# Patient Record
Sex: FEMALE | Race: WHITE | Employment: FULL TIME | URBAN - METROPOLITAN AREA
[De-identification: names, ages, dates, MRNs, and addresses within clinical notes are randomized per-mention and may not be internally consistent; named-entity substitution may affect disease eponyms.]

---

## 2017-09-02 ENCOUNTER — HOSPITAL ENCOUNTER (EMERGENCY)
Facility: CLINIC | Age: 22
Discharge: HOME | End: 2017-09-02
Attending: EMERGENCY MEDICINE | Admitting: EMERGENCY MEDICINE
Payer: MEDICAID

## 2017-09-02 VITALS
RESPIRATION RATE: 18 BRPM | DIASTOLIC BLOOD PRESSURE: 74 MMHG | OXYGEN SATURATION: 100 % | WEIGHT: 117 LBS | SYSTOLIC BLOOD PRESSURE: 127 MMHG | TEMPERATURE: 97.8 F | BODY MASS INDEX: 20.08 KG/M2 | HEART RATE: 72 BPM

## 2017-09-02 DIAGNOSIS — S09.90XA CLOSED HEAD INJURY, INITIAL ENCOUNTER: ICD-10-CM

## 2017-09-02 DIAGNOSIS — S01.01XA LACERATION OF SCALP, INITIAL ENCOUNTER: ICD-10-CM

## 2017-09-02 PROCEDURE — 12001 RPR S/N/AX/GEN/TRNK 2.5CM/<: CPT

## 2017-09-02 PROCEDURE — 99283 EMERGENCY DEPT VISIT LOW MDM: CPT

## 2017-09-02 RX ORDER — BUPIVACAINE HYDROCHLORIDE AND EPINEPHRINE 5; 5 MG/ML; UG/ML
INJECTION, SOLUTION PERINEURAL
Status: DISCONTINUED
Start: 2017-09-02 | End: 2017-09-02 | Stop reason: HOSPADM

## 2017-09-02 NOTE — ED NOTES
21-year-old female presents to the ER with complaints of a large wooden decorative plaque fall out of the closet and hit her on the back of her head. Pt states she has a laceration to the back of her head, bleeding controlled at this time.

## 2017-09-02 NOTE — ED AVS SNAPSHOT
[facility] Emergency Department    [address]    [facility] [address]    Phone:  [phone]    Fax:  [phone]                                       Desirae Kevin   MRN: [id number]    Department:  [facility] Emergency Department   Date of Visit:  9/2/2017           After Visit Summary Signature Page     I have received my discharge instructions, and my questions have been answered. I have discussed any challenges I see with this plan with the nurse or doctor.    ..........................................................................................................................................  Patient/Patient Representative Signature      ..........................................................................................................................................  Patient Representative Print Name and Relationship to Patient    ..................................................               ................................................  Date                                            Time    ..........................................................................................................................................  Reviewed by Signature/Title    ...................................................              ..............................................  Date                                                            Time

## 2017-09-02 NOTE — ED AVS SNAPSHOT
[facility] Emergency Department    [address]    [facility] [address]    Phone:  [phone]    Fax:  [phone]                                       Desirae Kevin   MRN: [id number]    Department:  [facility] Emergency Department   Date of Visit:  9/2/2017           Patient Information     Date Of Birth          1995        Your diagnoses for this visit were:     Closed head injury, initial encounter     Laceration of scalp, initial encounter        You were seen by Becky Baxter MD.      Follow-up Information     Follow up with Kim Hall MD In 1 week.    Specialty:  Pediatrics    Contact information:    [facility]  [address] [place] [address] [phone]          Follow up with [facility] Emergency Department.    Specialty:  EMERGENCY MEDICINE    Why:  As needed    Contact information:    [address] [facility] [address] [phone]        Discharge Instructions       Discharge Instructions  Head Injury    You have been seen today for a head injury. Your evaluation included a history and physical examination. You may have had a CT (CAT) scan performed, though most head injuries do not require a scan. Based on this evaluation, your provider today does not feel that your head injury is serious.    Generally, every Emergency Department visit should have a follow-up clinic visit with either a primary or a specialty clinic/provider. Please follow-up as instructed by your emergency provider today.  Return to the Emergency Department if:    You are confused or you are not acting right.    Your headache gets worse or you start to have a really bad headache even with your recommended treatment plan.    You vomit (throw up) more than once.    You have a seizure.    You have trouble walking.    You have weakness or paralysis (cannot move) in an arm or a leg.    You have blood or fluid  coming from your ears or nose.    You have new symptoms or anything that worries you.    Sleeping:  It is okay for you to sleep, but someone should wake you up if instructed by your provider, and someone should check on you at your usual time to wake up.     Activity:    Do not drive for at least 24 hours.    Do not drive if you have dizzy spells or trouble concentrating, or remembering things.    Do not return to any contact sports until cleared by your regular provider.     MORE INFORMATION:    Concussion:  A concussion is a minor head injury that may cause temporary problems with the way the brain works. Although concussions are important, they are generally not an emergency or a reason that a person needs to be hospitalized. Some concussion symptoms include confusion, amnesia (forgetful), nausea (sick to your stomach) and vomiting (throwing up), dizziness, fatigue, memory or concentration problems, irritability and sleep problems. For most people, concussions are mild and temporary but some will have more severe and persistent symptoms that require on-going care and treatment.  CT Scans: Your evaluation today may have included a CT scan (CAT scan) to look for things like bleeding or a skull fracture (broken bone).  CT scans involve radiation and too many CT scans can cause serious health problems like cancer, especially in children.  Because of this, your provider may not have ordered a CT scan today if they think you are at low risk for a serious or life threatening problem.    If you were given a prescription for medicine here today, be sure to read all of the information (including the package insert) that comes with your prescription.  This will include important information about the medicine, its side effects, and any warnings that you need to know about.  The pharmacist who fills the prescription can provide more information and answer questions you may have about the medicine.  If you have questions or  concerns that the pharmacist cannot address, please call or return to the Emergency Department.     Remember that you can always come back to the Emergency Department if you are not able to see your regular provider in the amount of time listed above, if you get any new symptoms, or if there is anything that worries you.Discharge Instructions  Laceration (Cut)    You were seen today for a laceration (cut).  Your provider examined your laceration for any problems such a buried foreign body (like glass, a splinter, or gravel), or injury to blood vessels, tendons, and nerves.  Your provider may have also rinsed and/or scrubbed your laceration to help prevent an infection. It may not be possible to find all problems with your laceration on the first visit; occasionally foreign bodies or a tendon injury can go undetected.    Your laceration may have been closed in one of several ways:    No closure: many wounds will heal just fine without closure.    Stitches: regular stitches that require removal.    Staples: skin staples are often used in the scalp/head.    Wound adhesive (glue): skin glue can be used for certain lacerations and doesn t require removal.    Wound strips (aka Butterfly bandages or steri-strips): these are bandages that help to close a wound.    Absorbable stitches:  dissolving  stitches that go away on their own and usually don t require removal.    A small percentage of wounds will develop an infection regardless of how well the wound is cared for. Antibiotics are generally not indicated to prevent an infection so are only given for a small number of high-risk wounds. Some lacerations are too high risk to close, and are left open to heal because closure can increase the likelihood that an infection will develop.    Remember that all lacerations, no matter how expertly repaired, will cause scarring. We consider many factors, techniques, and materials, in our efforts to provide the best possible cosmetic  outcome.    Generally, every Emergency Department visit should have a follow-up clinic visit with either a primary or a specialty clinic/provider. Please follow-up as instructed by your emergency provider today.     Return to the Emergency Department right away if:    You have more redness, swelling, pain, drainage (pus), a bad smell, or red streaking from your laceration as these symptoms could indicate an infection.    You have a fever of 100.4 F or more.    You have bleeding that you cannot stop at home. If your cut starts to bleed, hold pressure on the bleeding area with a clean cloth or put pressure over the bandage.  If the bleeding does not stop after using constant pressure for 30 minutes, you should return to the Emergency Department for further treatment.    An area past the laceration is cool, pale, or blue compared with the other side, or has a slower return of color when squeezed.    Your dressing seems too tight or starts to get uncomfortable or painful. For children, signs of a problem might be irritability or restlessness.    You have loss of normal function or use of an area, such as being unable to straighten or bend a finger normally.    You have a numb area past the laceration.    Return to the Emergency Department or see your regular provider if:    The laceration starts to come open.     You have something coming out of the cut or a feeling that there is something in the laceration.    Your wound will not heal, or keeps breaking open. There can always be glass, wood, dirt or other things in any wound.  They will not always show up, even on x-rays.  If a wound does not heal, this may be why, and it is important to follow-up with your regular provider.    Home Care:    Take your dressing off in 12-24 hours, or as instructed by your provider, to check your laceration. Remove the dressing sooner if it seems too tight or painful, or if it is getting numb, tingly, or pale past the dressing.    Gently  wash your laceration 1-2 times daily with clean water and mild soap. It is okay to shower or run clean water over the laceration, but do not let the laceration soak in water (no swimming).    If your laceration was closed with wound adhesive or strips: pat it dry and leave it open to the air. For all other repairs: after you wash your laceration, or at least 2 times a day, apply antibiotic ointment (such as Neosporin  or Bacitracin ) to the laceration, then cover it with a Band-Aid  or gauze.    Keep the laceration clean. Wear gloves or other protective clothing if you are around dirt.    Follow-up for removal:    If your wound was closed with staples or regular stitches, they need to be removed according to the instructions and timeline specified by your provider today.    If your wound was closed with absorbable ( dissolving ) sutures, they should fall out, dissolve, or not be visible in about one week. If they are still visible, then they should be removed according to the instructions and timeline specified by your provider today.    Scars:  To help minimize scarring:    Wear sunscreen over the healed laceration when out in the sun.    Massage the area regularly once healed.    You may apply Vitamin E to the healed wound.    Wait. Scars improve in appearance over months and years.    If you were given a prescription for medicine here today, be sure to read all of the information (including the package insert) that comes with your prescription.  This will include important information about the medicine, its side effects, and any warnings that you need to know about.  The pharmacist who fills the prescription can provide more information and answer questions you may have about the medicine.  If you have questions or concerns that the pharmacist cannot address, please call or return to the Emergency Department.       Remember that you can always come back to the Emergency Department if you are not able to see your  regular provider in the amount of time listed above, if you get any new symptoms, or if there is anything that worries you.      24 Hour Appointment Hotline       To make an appointment at any [facility], call [phone] ([phone]). If you don't have a family doctor or clinic, we will help you find one. [facility] are conveniently located to serve the needs of you and your family.             Review of your medicines      Our records show that you are taking the medicines listed below. If these are incorrect, please call your family doctor or clinic.        Dose / Directions Last dose taken    prenatal multivitamin plus iron 27-0.8 MG Tabs per tablet   Dose:  1 tablet        Take 1 tablet by mouth daily   Refills:  0                Orders Needing Specimen Collection     None      Pending Results     No orders found from 8/31/2017 to 9/3/2017.            Pending Culture Results     No orders found from 8/31/2017 to 9/3/2017.            Pending Results Instructions     If you had any lab results that were not finalized at the time of your Discharge, you can call the ED Lab Result RN at [phone]. You will be contacted by this team for any positive Lab results or changes in treatment. The nurses are available 7 days a week from 10A to 6:30P.  You can leave a message 24 hours per day and they will return your call.        Test Results From Your Hospital Stay               Clinical Quality Measure: Blood Pressure Screening     Your blood pressure was checked while you were in the emergency department today. The last reading we obtained was  BP: 127/74 . Please read the guidelines below about what these numbers mean and what you should do about them.  If your systolic blood pressure (the top number) is less than 120 and your diastolic blood pressure (the bottom number) is less than 80, then your blood pressure is normal. There is nothing more that you need to do about it.  If your systolic blood  "pressure (the top number) is 120-139 or your diastolic blood pressure (the bottom number) is 80-89, your blood pressure may be higher than it should be. You should have your blood pressure rechecked within a year by a primary care provider.  If your systolic blood pressure (the top number) is 140 or greater or your diastolic blood pressure (the bottom number) is 90 or greater, you may have high blood pressure. High blood pressure is treatable, but if left untreated over time it can put you at risk for heart attack, stroke, or kidney failure. You should have your blood pressure rechecked by a primary care provider within the next 4 weeks.  If your provider in the emergency department today gave you specific instructions to follow-up with your doctor or provider even sooner than that, you should follow that instruction and not wait for up to 4 weeks for your follow-up visit.        Thank you for choosing [facility]       Thank you for choosing [facility] for your care. Our goal is always to provide you with excellent care. Hearing back from our patients is one way we can continue to improve our services. Please take a few minutes to complete the written survey that you may receive in the mail after you visit with us. Thank you!        [facility] Information     [employer] lets you send messages to your doctor, view your test results, renew your prescriptions, schedule appointments and more. To sign up, go to [email or web address] . Click on \"Log in\" on the left side of the screen, which will take you to the Welcome page. Then click on \"Sign up Now\" on the right side of the page.     You will be asked to enter the access code listed below, as well as some personal information. Please follow the directions to create your username and password.     Your access code is: [email or web address]  Expires: 2017  2:03 AM     Your access code will  in 90 days. If you need help or a new code, please call your [facility] clinic or " [phone].        Care EveryWhere ID     This is your Care EveryWhere ID. This could be used by other organizations to access your [facility] medical records  [id number]        Equal Access to Services     KATHERINE KRAUS : Eduar Tovar, warishi khan, qaybta kaalmada barbara, zenon xiao. So [facility] [phone].    ATENCIÓN: Si habla español, tiene a justice disposición servicios gratuitos de asistencia lingüística. Llame al [phone].    We comply with applicable federal civil rights laws and [place] laws. We do not discriminate on the basis of race, color, national origin, age, disability sex, sexual orientation or gender identity.            After Visit Summary       This is your record. Keep this with you and show to your community pharmacist(s) and doctor(s) at your next visit.

## 2017-09-02 NOTE — ED PROVIDER NOTES
[facility] Emergency Medicine Note:    History     Chief Complaint:  Laceration and Head Injury      HPI: Desirae Kevin is a 21 year old female who presents for evaluation of  a head injury.  A key rack fell off the wall hitting her in the back of her head approximately 1 hour prior to arrival.  She had no loss of consciousness no nausea or vomiting and has had no neurologic symptoms.  She has pain at the site of the laceration at the apex of her scalp.  Her tetanus was updated in 2015.      Allergies:  No Known Allergies    Medications:      Prenatal Vit-Fe Fumarate-FA (PRENATAL MULTIVITAMIN  PLUS IRON) 27-0.8 MG TABS       Problem List:    Patient Active Problem List    Diagnosis Date Noted     Labor and delivery, indication for care 11/06/2015     Priority: Medium     Vaginal delivery 03/28/2014     Priority: Medium       Past Medical History:    Past Medical History:   Diagnosis Date     Anemia 2015     Migraine        Past Surgical History:    History reviewed. No pertinent surgical history.    Family History:    No family history on file.    Social History:  Social History   Substance Use Topics     Smoking status: Never Smoker     Smokeless tobacco: Never Used     Alcohol use No       Review of Systems  See HPI, a 10 point review of systems was performed and is otherwise negative except as noted in HPI.     Physical Exam   Vital signs:  Patient Vitals for the past 24 hrs:   BP Temp Temp src Pulse Resp SpO2 Weight   09/02/17 0102 127/74 97.8  F (36.6  C) Temporal 72 18 100 % 53.1 kg (117 lb)       Physical Exam    Nursing note and vitals reviewed.    Constitutional: Pleasant and well groomed.          HENT: There is a 2 cm laceration at the apex of her scalp.  There is no significant scalp hematoma.   Mouth/Throat: Oropharynx is without swelling or erythema. Oral mucosa moist.    Eyes: Conjunctivae normal are normal. No scleral icterus.    Neck: Neck supple. No midline cervical tenderness. Full  range of motion.   Cardiovascular: Normal rate, regular rhythm and intact distal pulses.    Pulmonary/Chest: Effort normal and breath sounds normal.   Abdominal: Soft.  No distension. There is no tenderness.   Musculoskeletal:  No edema, No calf tenderness  Neurological:Alert and oriented.  Cranial nerves II through XII intact Coordination normal.  upper and lower extremity strength intact.  She has a normal steady gait  Skin: Skin is warm and dry.   Psychiatric: Normal mood and affect.         Emergency Department Course       Procedures:     Laceration Repair      LACERATION:  A simple clean 2 cm laceration.    LOCATION:  [place] of scalp.    FUNCTION:  No associated bone abnormality on exploration. Neuro intact.     ANESTHESIA:  Local using bupivicaine with epinephrine  total of 5 mLs.    PREPARATION:  Irrigation with Normal Saline.    DEBRIDEMENT:  no debridement.    CLOSURE:  Wound was closed with 5 Staples.      Interventions:  Medications   bupivacaine 0.5 % -  EPINEPHrine 1:200,000 0.5% -[id number] injection (not administered)       Emergency Department Course:  I performed the above history and physical examination.   See above for procedure note.   I explained the plan for treatment, follow up and indications for return to the ED.       Impression & Plan    Medical Decision Making:  This patient presents with a history of a mild closed head injury resulting in a scalp laceration..  The differential diagnosis includes skull fracture, epidural hematoma, subdural hematoma, intracerebral hemorrhage, and traumatic subarachnoid hemorrhage; all of these are highly unlikely in this clinical setting.  This patient denies severe headache, seizure, and has no focal neurological findings.  The patient did not have prolonged LOC, sleepiness, repeated emesis, poor orientation, or significant irritability.  I have discussed the risk/benefit analysis with the patient and family regarding CT imaging.  We are in agreement that a  CT scan will not be obtained at this time. This decision making is in agreement with Brain CT Guidelines.     The laceration was explored and there was no bony abnormality.  The laceration was repaired per the procedure note above.  The patient received standard laceration discharge instructions.     The patient/family understand that they must return to the ED with the development of worrisome signs or symptoms including but not limited to; worsening headache , increased drowsiness, strange behavior, repetitive speech, seizures, repeated vomiting, growing confusion, increased irritability, slurred speech, weakness or numbness, and loss of responsiveness.  We have discussed post concussive syndrome and they were provided with the [facility] Concussion Discharge Instructions. I have recommended follow up with PMD in 3  days for ongoing evaluation and management if you have any concerns about ongoing headache.       Diagnosis:     ICD-10-CM    1. Closed head injury, initial encounter S09.90XA    2. Laceration of scalp, initial encounter S01.01XA        Plan:   1. Return to the ED with new or worse symptoms  2. Follow up with your PMDfor ongoing evaluation and management  3. Provided with standard [facility] Discharge instructions for Head Injury and Concussion and laceration repair.       Diagnosis:    ICD-10-CM    1. Closed head injury, initial encounter S09.90XA    2. Laceration of scalp, initial encounter S01.01XA        Disposition:  discharged to home with responsible adult.     Discharge Medications:  New Prescriptions    No medications on file          Becky Baxter MD  09/02/17 0212

## 2017-09-02 NOTE — DISCHARGE INSTRUCTIONS
Discharge Instructions  Head Injury    You have been seen today for a head injury. Your evaluation included a history and physical examination. You may have had a CT (CAT) scan performed, though most head injuries do not require a scan. Based on this evaluation, your provider today does not feel that your head injury is serious.    Generally, every Emergency Department visit should have a follow-up clinic visit with either a primary or a specialty clinic/provider. Please follow-up as instructed by your emergency provider today.  Return to the Emergency Department if:    You are confused or you are not acting right.    Your headache gets worse or you start to have a really bad headache even with your recommended treatment plan.    You vomit (throw up) more than once.    You have a seizure.    You have trouble walking.    You have weakness or paralysis (cannot move) in an arm or a leg.    You have blood or fluid coming from your ears or nose.    You have new symptoms or anything that worries you.    Sleeping:  It is okay for you to sleep, but someone should wake you up if instructed by your provider, and someone should check on you at your usual time to wake up.     Activity:    Do not drive for at least 24 hours.    Do not drive if you have dizzy spells or trouble concentrating, or remembering things.    Do not return to any contact sports until cleared by your regular provider.     MORE INFORMATION:    Concussion:  A concussion is a minor head injury that may cause temporary problems with the way the brain works. Although concussions are important, they are generally not an emergency or a reason that a person needs to be hospitalized. Some concussion symptoms include confusion, amnesia (forgetful), nausea (sick to your stomach) and vomiting (throwing up), dizziness, fatigue, memory or concentration problems, irritability and sleep problems. For most people, concussions are mild and temporary but some will have more  severe and persistent symptoms that require on-going care and treatment.  CT Scans: Your evaluation today may have included a CT scan (CAT scan) to look for things like bleeding or a skull fracture (broken bone).  CT scans involve radiation and too many CT scans can cause serious health problems like cancer, especially in children.  Because of this, your provider may not have ordered a CT scan today if they think you are at low risk for a serious or life threatening problem.    If you were given a prescription for medicine here today, be sure to read all of the information (including the package insert) that comes with your prescription.  This will include important information about the medicine, its side effects, and any warnings that you need to know about.  The pharmacist who fills the prescription can provide more information and answer questions you may have about the medicine.  If you have questions or concerns that the pharmacist cannot address, please call or return to the Emergency Department.     Remember that you can always come back to the Emergency Department if you are not able to see your regular provider in the amount of time listed above, if you get any new symptoms, or if there is anything that worries you.Discharge Instructions  Laceration (Cut)    You were seen today for a laceration (cut).  Your provider examined your laceration for any problems such a buried foreign body (like glass, a splinter, or gravel), or injury to blood vessels, tendons, and nerves.  Your provider may have also rinsed and/or scrubbed your laceration to help prevent an infection. It may not be possible to find all problems with your laceration on the first visit; occasionally foreign bodies or a tendon injury can go undetected.    Your laceration may have been closed in one of several ways:    No closure: many wounds will heal just fine without closure.    Stitches: regular stitches that require removal.    Staples: skin  staples are often used in the scalp/head.    Wound adhesive (glue): skin glue can be used for certain lacerations and doesn t require removal.    Wound strips (aka Butterfly bandages or steri-strips): these are bandages that help to close a wound.    Absorbable stitches:  dissolving  stitches that go away on their own and usually don t require removal.    A small percentage of wounds will develop an infection regardless of how well the wound is cared for. Antibiotics are generally not indicated to prevent an infection so are only given for a small number of high-risk wounds. Some lacerations are too high risk to close, and are left open to heal because closure can increase the likelihood that an infection will develop.    Remember that all lacerations, no matter how expertly repaired, will cause scarring. We consider many factors, techniques, and materials, in our efforts to provide the best possible cosmetic outcome.    Generally, every Emergency Department visit should have a follow-up clinic visit with either a primary or a specialty clinic/provider. Please follow-up as instructed by your emergency provider today.     Return to the Emergency Department right away if:    You have more redness, swelling, pain, drainage (pus), a bad smell, or red streaking from your laceration as these symptoms could indicate an infection.    You have a fever of 100.4 F or more.    You have bleeding that you cannot stop at home. If your cut starts to bleed, hold pressure on the bleeding area with a clean cloth or put pressure over the bandage.  If the bleeding does not stop after using constant pressure for 30 minutes, you should return to the Emergency Department for further treatment.    An area past the laceration is cool, pale, or blue compared with the other side, or has a slower return of color when squeezed.    Your dressing seems too tight or starts to get uncomfortable or painful. For children, signs of a problem might be  irritability or restlessness.    You have loss of normal function or use of an area, such as being unable to straighten or bend a finger normally.    You have a numb area past the laceration.    Return to the Emergency Department or see your regular provider if:    The laceration starts to come open.     You have something coming out of the cut or a feeling that there is something in the laceration.    Your wound will not heal, or keeps breaking open. There can always be glass, wood, dirt or other things in any wound.  They will not always show up, even on x-rays.  If a wound does not heal, this may be why, and it is important to follow-up with your regular provider.    Home Care:    Take your dressing off in 12-24 hours, or as instructed by your provider, to check your laceration. Remove the dressing sooner if it seems too tight or painful, or if it is getting numb, tingly, or pale past the dressing.    Gently wash your laceration 1-2 times daily with clean water and mild soap. It is okay to shower or run clean water over the laceration, but do not let the laceration soak in water (no swimming).    If your laceration was closed with wound adhesive or strips: pat it dry and leave it open to the air. For all other repairs: after you wash your laceration, or at least 2 times a day, apply antibiotic ointment (such as Neosporin  or Bacitracin ) to the laceration, then cover it with a Band-Aid  or gauze.    Keep the laceration clean. Wear gloves or other protective clothing if you are around dirt.    Follow-up for removal:    If your wound was closed with staples or regular stitches, they need to be removed according to the instructions and timeline specified by your provider today.    If your wound was closed with absorbable ( dissolving ) sutures, they should fall out, dissolve, or not be visible in about one week. If they are still visible, then they should be removed according to the instructions and timeline  specified by your provider today.    Scars:  To help minimize scarring:    Wear sunscreen over the healed laceration when out in the sun.    Massage the area regularly once healed.    You may apply Vitamin E to the healed wound.    Wait. Scars improve in appearance over months and years.    If you were given a prescription for medicine here today, be sure to read all of the information (including the package insert) that comes with your prescription.  This will include important information about the medicine, its side effects, and any warnings that you need to know about.  The pharmacist who fills the prescription can provide more information and answer questions you may have about the medicine.  If you have questions or concerns that the pharmacist cannot address, please call or return to the Emergency Department.       Remember that you can always come back to the Emergency Department if you are not able to see your regular provider in the amount of time listed above, if you get any new symptoms, or if there is anything that worries you.

## 2018-03-16 LAB
ABO + RH BLD: NORMAL
ABO + RH BLD: NORMAL
BLD GP AB SCN SERPL QL: NORMAL
C TRACH DNA SPEC QL PROBE+SIG AMP: NEGATIVE
CULTURE MICRO: NO GROWTH
HBV SURFACE AG SERPL QL IA: NONREACTIVE
HEMOGLOBIN: 13.7 G/DL (ref 11.7–15.7)
HIV 1+2 AB+HIV1 P24 AG SERPL QL IA: NONREACTIVE
N GONORRHOEA DNA SPEC QL PROBE+SIG AMP: NEGATIVE
PAP: NORMAL
RUBELLA ANTIBODY IGG QUANTITATIVE: NORMAL IU/ML
TSH SERPL-ACNC: 2.14 MCU/ML

## 2018-06-27 ENCOUNTER — HOSPITAL ENCOUNTER (EMERGENCY)
Facility: CLINIC | Age: 23
Discharge: HOME | End: 2018-06-27
Attending: EMERGENCY MEDICINE | Admitting: EMERGENCY MEDICINE

## 2018-06-27 VITALS
WEIGHT: 120 LBS | OXYGEN SATURATION: 100 % | HEART RATE: 86 BPM | DIASTOLIC BLOOD PRESSURE: 77 MMHG | RESPIRATION RATE: 18 BRPM | TEMPERATURE: 97.2 F | BODY MASS INDEX: 20.6 KG/M2 | SYSTOLIC BLOOD PRESSURE: 116 MMHG
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
## 2018-06-27 DIAGNOSIS — Z33.1 PREGNANCY, INCIDENTAL: ICD-10-CM

## 2018-06-27 PROCEDURE — 99282 EMERGENCY DEPT VISIT SF MDM: CPT

## 2018-06-27 ASSESSMENT — ENCOUNTER SYMPTOMS
NECK PAIN: 0
BACK PAIN: 0
ABDOMINAL PAIN: 0
ARTHRALGIAS: 0
HEADACHES: 0
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
## 2018-06-27 NOTE — ED AVS SNAPSHOT
[facility] Emergency Department    [address]    [facility] [address]    Phone:  [phone]    Fax:  [phone]                                       Desirae Kevin   MRN: [id number]    Department:  [facility] Emergency Department   Date of Visit:  6/27/2018           After Visit Summary Signature Page     I have received my discharge instructions, and my questions have been answered. I have discussed any challenges I see with this plan with the nurse or doctor.    ..........................................................................................................................................  Patient/Patient Representative Signature      ..........................................................................................................................................  Patient Representative Print Name and Relationship to Patient    ..................................................               ................................................  Date                                            Time    ..........................................................................................................................................  Reviewed by Signature/Title    ...................................................              ..............................................  Date                                                            Time

## 2018-06-28 NOTE — DISCHARGE INSTRUCTIONS
Please make an appointment to follow up with your primary care provider in 2-3 days if not improving.    Return to ER immediately if you develop: abdominal pain, vaginal bleeding , Fever > 101, persistent nausea or vomiting OR you have any other concerns about your health.
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
## 2018-08-24 LAB
GLU GEST SCREEN 1HR 50G: 78
HEMOGLOBIN: 10.9 G/DL (ref 11.7–15.7)

## 2018-09-19 ENCOUNTER — PRENATAL OFFICE VISIT (OUTPATIENT)
Dept: OBGYN | Facility: CLINIC | Age: 23
End: 2018-09-19
Payer: COMMERCIAL

## 2018-09-19 VITALS
DIASTOLIC BLOOD PRESSURE: 56 MMHG | SYSTOLIC BLOOD PRESSURE: 102 MMHG | HEIGHT: 64 IN | WEIGHT: 142.7 LBS | BODY MASS INDEX: 24.36 KG/M2

## 2018-09-19 DIAGNOSIS — O09.899 HISTORY OF PRETERM DELIVERY, CURRENTLY PREGNANT: Primary | ICD-10-CM

## 2018-09-19 DIAGNOSIS — Z87.59 HISTORY OF POLYHYDRAMNIOS: ICD-10-CM

## 2018-09-19 DIAGNOSIS — Z23 NEED FOR TDAP VACCINATION: ICD-10-CM

## 2018-09-19 DIAGNOSIS — Z23 NEED FOR PROPHYLACTIC VACCINATION AND INOCULATION AGAINST INFLUENZA: ICD-10-CM

## 2018-09-19 PROCEDURE — 90472 IMMUNIZATION ADMIN EACH ADD: CPT | Performed by: OBSTETRICS & GYNECOLOGY

## 2018-09-19 PROCEDURE — 90471 IMMUNIZATION ADMIN: CPT | Performed by: OBSTETRICS & GYNECOLOGY

## 2018-09-19 PROCEDURE — 90715 TDAP VACCINE 7 YRS/> IM: CPT | Performed by: OBSTETRICS & GYNECOLOGY

## 2018-09-19 PROCEDURE — 90686 IIV4 VACC NO PRSV 0.5 ML IM: CPT | Performed by: OBSTETRICS & GYNECOLOGY

## 2018-09-19 PROCEDURE — 99207 ZZC FIRST OB VISIT: CPT | Performed by: OBSTETRICS & GYNECOLOGY

## 2018-09-19 NOTE — PROGRESS NOTES
21 yo  at 32w3d transfer of care from  Clinic.      OBHx:   3 SVDs.  All 3 complicated by polyhydramnios.  1 PTD at 36+ weeks    Current pregnancy uncomplicated to date.  S>D so will order U/S to r/o polyhydramnios.    TDaP and Flu shot today.    RTC 2 weeks

## 2018-09-19 NOTE — MR AVS SNAPSHOT
After Visit Summary   2018    Desirae Kevin    MRN: [id number]           Patient Information     Date Of Birth          1995        Visit Information        Provider Department      2018 1:45 PM Harpal Koch MD [facility]        Today's Diagnoses     History of  delivery, currently pregnant    -  1    History of polyhydramnios        Need for Tdap vaccination        Need for prophylactic vaccination and inoculation against influenza           Follow-ups after your visit        Your next 10 appointments already scheduled     Oct 03, 2018  2:00 PM CDT   US OB > 14 WEEKS COMPLETE SINGLE with OXUS1   [facility] ([facility])    [address] [address] [phone]           How do I prepare for my exam? (Food and drink instructions) Drink four 8-ounce glasses of fluid an hour before your exam. If you need to empty your bladder before your exam, try to release only a little urine. Then, drink another glass of fluid.  How do I prepare for my exam? (Other instructions) You may have up to two family members in the exam room. If you bring a small child, an adult must be there to care for him or her. No video or camera photography during the procedure.  What should I wear: Wear comfortable clothes.  How long does the exam take: Most ultrasounds take 30 to 60 minutes.  What should I bring: Bring a list of your medicines, including vitamins, minerals and over-the-counter drugs. It is safest to leave personal items at home.  Do I need a :  No  is needed.  What do I need to tell my doctor: Tell your doctor about any allergies you may have.  What should I do after the exam: No restrictions, You may resume normal activities.  What is this test: An ultrasound uses sound waves to make pictures of the body. Sound waves do not cause pain. The only discomfort may be the pressure  of the wand against your skin or full bladder.  Who should I call with questions: If you have any questions, please call the Imaging Department where you will have your exam. Directions, parking instructions, and other information is available on our website, [email or web address].            Oct 04, 2018  9:30 AM CDT   ESTABLISHED PRENATAL with Harpal Koch MD   [facility] ([facility]    [address]  [address]  [facility] [address]   [phone]            Oct 17, 2018  1:30 PM CDT   ESTABLISHED PRENATAL with Hrapal Koch MD   [facility]    [address] [place]  [address]  [facility] [id number] [phone]            Oct 24, 2018  1:30 PM CDT   ESTABLISHED PRENATAL with Harpal Koch MD   [facility]    [address] [place]  [address]  [facility] [address]   [phone]            Oct 31, 2018  1:30 PM CDT   ESTABLISHED PRENATAL with Harpal Koch MD   [facility]    [address] [place]  [address]  [facility] [address] [phone]            Nov 07, 2018  1:30 PM CST   ESTABLISHED PRENATAL with Harpal Koch MD   [facility]    [address] [place]  [address]  [facility] [address] [phone]              Future tests that were ordered for you today     Open Future Orders        Priority Expected Expires Ordered    US OB > 14 Weeks Complete Single Routine  9/19/2019 9/19/2018            Who to contact     If you have questions or need follow up information about today's clinic visit or your schedule please contact [facility] directly at [phone].  Normal or non-critical lab and imaging results will be communicated to you by MyChart, letter or phone within 4 business days after the  "clinic has received the results. If you do not hear from us within 7 days, please contact the clinic through [place] or phone. If you have a critical or abnormal lab result, we will notify you by phone as soon as possible.  Submit refill requests through [place] or call your pharmacy and they will forward the refill request to us. Please allow 3 business days for your refill to be completed.          Additional Information About Your Visit        Care EveryWhere ID     This is your Care EveryWhere ID. This could be used by other organizations to access your [facility] medical records  [id number]        Your Vitals Were     Height Last Period BMI (Body Mass Index)             5' 3.75\" (1.619 m) 01/18/2018 (Exact Date) 24.69 kg/m2          Blood Pressure from Last 3 Encounters:   09/19/18 102/56   06/27/18 116/77   09/02/17 127/74    Weight from Last 3 Encounters:   09/19/18 142 lb 11.2 oz (64.7 kg)   06/27/18 120 lb (54.4 kg)   09/02/17 117 lb (53.1 kg)              We Performed the Following     ABO and Rh     Antibody screen red cell     Chlamydia trachomatis PCR     Conventional pap smear, diagnostic     FLU VACCINE, SPLIT VIRUS, IM (QUADRIVALENT) [89351]- >3 YRS     Glucose tolerance gest screen 1 hour     Hepatitis B surface antigen     HIV Antigen Antibody Combo     Neisseria gonorrhoeae PCR     OB hemoglobin     OB hemoglobin     Rubella Antibody IgG Quantitative     TDAP VACCINE (ADACEL)     TSH with free T4 reflex     Urine Culture Aerobic Bacterial     Vaccine Administration, Each Additional [37167]     Vaccine Administration, Initial [[id number]]        Primary Care Provider Office Phone # Fax #    [address] [facility] [phone] [address] [facility] [address]        Equal Access to Services     HARJIT KRAUS : Eduar Tovar, silviano khan, zenon peterson. So [facility] [phone].    ATENCIÓN: Si habla " español, tiene a justice disposición servicios gratuitos de asistencia lingüística. Cyrus wright [phone].    We comply with applicable federal civil rights laws and [place] laws. We do not discriminate on the basis of race, color, national origin, age, disability, sex, sexual orientation, or gender identity.            Thank you!     Thank you for choosing [facility]  for your care. Our goal is always to provide you with excellent care. Hearing back from our patients is one way we can continue to improve our services. Please take a few minutes to complete the written survey that you may receive in the mail after your visit with us. Thank you!             Your Updated Medication List - Protect others around you: Learn how to safely use, store and throw away your medicines at www.disposemymeds.org.          This list is accurate as of 9/19/18  3:18 PM.  Always use your most recent med list.                   Brand Name Dispense Instructions for use Diagnosis    prenatal multivitamin plus iron 27-0.8 MG Tabs per tablet      Take 1 tablet by mouth daily

## 2018-09-19 NOTE — PROGRESS NOTES

## 2018-09-19 NOTE — NURSING NOTE
"Chief Complaint   Patient presents with     Prenatal Care     1st prenatal visit at [place]   No concerns today    initial /56  Ht 5' 3.75\" (1.619 m)  Wt 142 lb 11.2 oz (64.7 kg)  LMP 01/18/2018 (Exact Date)  BMI 24.69 kg/m2 Estimated body mass index is 24.69 kg/(m^2) as calculated from the following:    Height as of this encounter: 5' 3.75\" (1.619 m).    Weight as of this encounter: 142 lb 11.2 oz (64.7 kg).  BP completed using cuff size regular.  Corrie Valladares CMA    "

## 2018-10-03 ENCOUNTER — RADIANT APPOINTMENT (OUTPATIENT)
Dept: ULTRASOUND IMAGING | Facility: CLINIC | Age: 23
End: 2018-10-03
Attending: OBSTETRICS & GYNECOLOGY
Payer: COMMERCIAL

## 2018-10-03 DIAGNOSIS — Z87.59 HISTORY OF POLYHYDRAMNIOS: ICD-10-CM

## 2018-10-03 DIAGNOSIS — O09.899 HISTORY OF PRETERM DELIVERY, CURRENTLY PREGNANT: ICD-10-CM

## 2018-10-03 PROCEDURE — 76816 OB US FOLLOW-UP PER FETUS: CPT | Performed by: OBSTETRICS & GYNECOLOGY

## 2018-10-04 ENCOUNTER — PRENATAL OFFICE VISIT (OUTPATIENT)
Dept: OBGYN | Facility: CLINIC | Age: 23
End: 2018-10-04
Payer: COMMERCIAL

## 2018-10-04 VITALS
DIASTOLIC BLOOD PRESSURE: 54 MMHG | WEIGHT: 144 LBS | SYSTOLIC BLOOD PRESSURE: 98 MMHG | BODY MASS INDEX: 24.91 KG/M2 | HEART RATE: 92 BPM

## 2018-10-04 DIAGNOSIS — O40.3XX0 POLYHYDRAMNIOS IN THIRD TRIMESTER, ANTEPARTUM COMPLICATION: Primary | ICD-10-CM

## 2018-10-04 DIAGNOSIS — Z87.59 HISTORY OF POLYHYDRAMNIOS: ICD-10-CM

## 2018-10-04 DIAGNOSIS — O09.899 HISTORY OF PRETERM DELIVERY, CURRENTLY PREGNANT: ICD-10-CM

## 2018-10-04 PROCEDURE — 99207 ZZC PRENATAL VISIT: CPT | Performed by: OBSTETRICS & GYNECOLOGY

## 2018-10-04 NOTE — PROGRESS NOTES
No problems.    U/S yesterday reviewed.  EFW 58%tile with polyhydramnios.  Hx of same.  M referral for Level 2 U/S advised and order placed.  RTC 2 weeks.   plans vasectomy PP.

## 2018-10-04 NOTE — MR AVS SNAPSHOT
After Visit Summary   10/4/2018    Desirae Kevin    MRN: [id number]           Patient Information     Date Of Birth          1995        Visit Information        Provider Department      10/4/2018 9:30 AM Harpal Koch MD [facility]        Today's Diagnoses     Polyhydramnios in third trimester, antepartum complication    -  1    History of  delivery, currently pregnant        History of polyhydramnios           Follow-ups after your visit        Additional Services     MAT FETAL MED CTR REFERRAL-PREGNANCY       Polyhydramnios at 34 weeks gestation.  Has had polyhydramnios in 3 prior pregnancies.  All normal fetuses.  No GDM present.                  Your next 10 appointments already scheduled     Oct 17, 2018  1:30 PM CDT   ESTABLISHED PRENATAL with Harpal Koch MD   [facility] ([facility])    [address]  [address] [place] [id number] [phone]            Oct 24, 2018  1:30 PM CDT   ESTABLISHED PRENATAL with Hapral Koch MD   [facility] ([facility])    [address]  [address] [place] [id number] [phone]            Oct 31, 2018  1:30 PM CDT   ESTABLISHED PRENATAL with Harpal Koch MD   [facility] ([facility])    [address]  [address] [place] [id number] [phone]            2018  1:30 PM CST   ESTABLISHED PRENATAL with Harpal Koch MD   [facility] ([facility])    [address]  [address] [place] [id number] [phone]              Who to contact     If you have questions or need follow up information about today's clinic visit or your schedule please contact [facility] directly at [phone].  Normal or non-critical lab and imaging results will be communicated to you by  MyChart, letter or phone within 4 business days after the clinic has received the results. If you do not hear from us within 7 days, please contact the clinic through MyChart or phone. If you have a critical or abnormal lab result, we will notify you by phone as soon as possible.  Submit refill requests through [employer] or call your pharmacy and they will forward the refill request to us. Please allow 3 business days for your refill to be completed.          Additional Information About Your Visit        Care EveryWhere ID     This is your Care EveryWhere ID. This could be used by other organizations to access your [place] medical records  [id number]        Your Vitals Were     Pulse Last Period BMI (Body Mass Index)             92 01/18/2018 (Exact Date) 24.91 kg/m2          Blood Pressure from Last 3 Encounters:   10/04/18 98/54   09/19/18 102/56   06/27/18 116/77    Weight from Last 3 Encounters:   10/04/18 144 lb (65.3 kg)   09/19/18 142 lb 11.2 oz (64.7 kg)   06/27/18 120 lb (54.4 kg)              We Performed the Following     MAT FETAL MED CTR REFERRAL-PREGNANCY        Primary Care Provider Office Phone # Fax #    [address] [facility] [phone] [address] [facility] [address]        Equal Access to Services     KATHERINE KRAUS AH: Hadii haseeb ku hadasho Soomaali, waaxda luqadaha, qaybta kaalmada adeegyada, zenon malloy hayclint potter . So [facility] [phone].    ATENCIÓN: Si habla español, tiene a justice disposición servicios gratuitos de asistencia lingüística. Llame al [phone].    We comply with applicable federal civil rights laws and [place] laws. We do not discriminate on the basis of race, color, national origin, age, disability, sex, sexual orientation, or gender identity.            Thank you!     Thank you for choosing [facility]  for your care. Our goal is always to provide you with excellent care. Hearing back from our patients is one  way we can continue to improve our services. Please take a few minutes to complete the written survey that you may receive in the mail after your visit with us. Thank you!             Your Updated Medication List - Protect others around you: Learn how to safely use, store and throw away your medicines at www.disposemymeds.org.          This list is accurate as of 10/4/18  9:58 AM.  Always use your most recent med list.                   Brand Name Dispense Instructions for use Diagnosis    prenatal multivitamin plus iron 27-0.8 MG Tabs per tablet      Take 1 tablet by mouth daily

## 2018-10-04 NOTE — NURSING NOTE
"Chief Complaint   Patient presents with     Prenatal Care     baby active and moving - some contractions - review US results from yesterday     34w4d    Initial BP 98/54 (BP Location: Right arm, Patient Position: Chair, Cuff Size: Adult Regular)  Pulse 92  Wt 144 lb (65.3 kg)  LMP 01/18/2018 (Exact Date)  BMI 24.91 kg/m2 Estimated body mass index is 24.91 kg/(m^2) as calculated from the following:    Height as of 9/19/18: 5' 3.75\" (1.619 m).    Weight as of this encounter: 144 lb (65.3 kg).  Medication Reconciliation: complete     Nurse assisted visit.  Jeanne Loya MA.      "

## 2018-10-05 ENCOUNTER — PRE VISIT (OUTPATIENT)
Dept: MATERNAL FETAL MEDICINE | Facility: CLINIC | Age: 23
End: 2018-10-05

## 2018-10-05 ENCOUNTER — HOSPITAL ENCOUNTER (OUTPATIENT)
Dept: ULTRASOUND IMAGING | Facility: CLINIC | Age: 23
Discharge: HOME | End: 2018-10-05
Attending: OBSTETRICS & GYNECOLOGY | Admitting: OBSTETRICS & GYNECOLOGY
Payer: COMMERCIAL

## 2018-10-05 ENCOUNTER — OFFICE VISIT (OUTPATIENT)
Dept: MATERNAL FETAL MEDICINE | Facility: CLINIC | Age: 23
End: 2018-10-05
Attending: OBSTETRICS & GYNECOLOGY
Payer: COMMERCIAL

## 2018-10-05 DIAGNOSIS — O36.63X0 EXCESSIVE FETAL GROWTH AFFECTING MANAGEMENT OF PREGNANCY IN THIRD TRIMESTER, SINGLE OR UNSPECIFIED FETUS: ICD-10-CM

## 2018-10-05 DIAGNOSIS — O40.3XX0 POLYHYDRAMNIOS AFFECTING PREGNANCY IN THIRD TRIMESTER: Primary | ICD-10-CM

## 2018-10-05 DIAGNOSIS — O26.90 PREGNANCY RELATED CONDITION: ICD-10-CM

## 2018-10-05 PROCEDURE — 76819 FETAL BIOPHYS PROFIL W/O NST: CPT | Performed by: OBSTETRICS & GYNECOLOGY

## 2018-10-05 PROCEDURE — 76811 OB US DETAILED SNGL FETUS: CPT

## 2018-10-05 NOTE — MR AVS SNAPSHOT
After Visit Summary   10/5/2018    Desirae Kevin    MRN: [id number]           Patient Information     Date Of Birth          1995        Visit Information        Provider Department      10/5/2018 11:30 AM Srinivas Long MD [facility] Maternal Fetal Medicine - [place]        Today's Diagnoses     Polyhydramnios affecting pregnancy in third trimester    -  1    Excessive fetal growth affecting management of pregnancy in third trimester, single or unspecified fetus           Follow-ups after your visit        Your next 10 appointments already scheduled     Oct 17, 2018  1:30 PM CDT   ESTABLISHED PRENATAL with Harpal Koch MD   [facility] ([facility])    [address]  [address]  [facility] [address]   [phone]            Oct 24, 2018  1:30 PM CDT   ESTABLISHED PRENATAL with Harpal Koch MD   [facility] ([facility])    [address]  [address]  [facility] [address]   [phone]            Oct 31, 2018  1:30 PM CDT   ESTABLISHED PRENATAL with Harpal Koch MD   [facility] ([facility])    [address]  [address]  [facility] [address]   [phone]            Nov 07, 2018  1:30 PM CST   ESTABLISHED PRENATAL with Harpal Koch MD   [facility] ([facility])    [address]  [address]  [facility] [address]   [phone]              Future tests that were ordered for you today     Open Future Orders        Priority Expected Expires Ordered    [facility] Single Routine 10/19/2018 8/5/2019 10/5/2018    [facility] Comprehensive Single F/U Routine 10/26/2018 10/5/2019 10/5/2018    [facility] BPP Single Routine 10/12/2018 8/5/2019 10/5/2018    [facility] Comprehensive Single Routine  8/4/2019 10/4/2018            Who to contact     If you have questions or need follow up information  about today's clinic visit or your schedule please contact [facility] directly at [phone].  Normal or non-critical lab and imaging results will be communicated to you by MyChart, letter or phone within 4 business days after the clinic has received the results. If you do not hear from us within 7 days, please contact the clinic through MyChart or phone. If you have a critical or abnormal lab result, we will notify you by phone as soon as possible.  Submit refill requests through [employer] or call your pharmacy and they will forward the refill request to us. Please allow 3 business days for your refill to be completed.          Additional Information About Your Visit        Care EveryWhere ID     This is your Care EveryWhere ID. This could be used by other organizations to access your [place] medical records  [id number]        Your Vitals Were     Last Period                   01/18/2018 (Exact Date)            Blood Pressure from Last 3 Encounters:   10/04/18 98/54   09/19/18 102/56   06/27/18 116/77    Weight from Last 3 Encounters:   10/04/18 65.3 kg (144 lb)   09/19/18 64.7 kg (142 lb 11.2 oz)   06/27/18 54.4 kg (120 lb)               Primary Care Provider Office Phone # Fax #    [address] [facility] [phone] [address] [facility] [address]        Equal Access to Services     KATHERINE KRAUS AH: Hadii haseeb mooreo Sogonzalez, waaxda luqadaha, qaybta kaalmada barbara, zenon potter . So [facility] [phone].    ATENCIÓN: Si habla español, tiene a justice disposición servicios gratuitos de asistencia lingüística. Cyrus al [phone].    We comply with applicable federal civil rights laws and [place] laws. We do not discriminate on the basis of race, color, national origin, age, disability, sex, sexual orientation, or gender identity.            Thank you!     Thank you for choosing [facility]  for your care. Our goal is always to provide you with excellent care. Hearing back from our patients is one way we can continue to improve our services. Please take a few minutes to complete the written survey that you may receive in the mail after your visit with us. Thank you!             Your Updated Medication List - Protect others around you: Learn how to safely use, store and throw away your medicines at www.disposemymeds.org.          This list is accurate as of 10/5/18 11:58 AM.  Always use your most recent med list.                   Brand Name Dispense Instructions for use Diagnosis    prenatal multivitamin plus iron 27-0.8 MG Tabs per tablet      Take 1 tablet by mouth daily

## 2018-10-05 NOTE — PROGRESS NOTES
"Please see \"Imaging\" tab under \"Chart Review\" for details of today's US at the [facility].    Srinivas Long MD  Maternal-Fetal Medicine    "

## 2018-10-12 ENCOUNTER — OFFICE VISIT (OUTPATIENT)
Dept: MATERNAL FETAL MEDICINE | Facility: CLINIC | Age: 23
End: 2018-10-12
Attending: OBSTETRICS & GYNECOLOGY
Payer: COMMERCIAL

## 2018-10-12 ENCOUNTER — HOSPITAL ENCOUNTER (OUTPATIENT)
Dept: ULTRASOUND IMAGING | Facility: CLINIC | Age: 23
Discharge: HOME | End: 2018-10-12
Attending: OBSTETRICS & GYNECOLOGY | Admitting: OBSTETRICS & GYNECOLOGY
Payer: COMMERCIAL

## 2018-10-12 DIAGNOSIS — O40.3XX0 POLYHYDRAMNIOS AFFECTING PREGNANCY IN THIRD TRIMESTER: ICD-10-CM

## 2018-10-12 DIAGNOSIS — O40.3XX0 POLYHYDRAMNIOS AFFECTING PREGNANCY IN THIRD TRIMESTER: Primary | ICD-10-CM

## 2018-10-12 PROCEDURE — 76819 FETAL BIOPHYS PROFIL W/O NST: CPT

## 2018-10-12 NOTE — PROGRESS NOTES
"Please see \"Imaging\" tab under \"Chart Review\" for details of today's visit.    Juno Arenas    "

## 2018-10-12 NOTE — MR AVS SNAPSHOT
After Visit Summary   10/12/2018    Desirae Kevin    MRN: [id number]           Patient Information     Date Of Birth          1995        Visit Information        Provider Department      10/12/2018 4:00 PM Juno Arenas MD [facility] Maternal Fetal Medicine [facility]        Today's Diagnoses     Polyhydramnios affecting pregnancy in third trimester    -  1       Follow-ups after your visit        Your next 10 appointments already scheduled     Oct 17, 2018  1:30 PM CDT   ESTABLISHED PRENATAL with [facility]pal [facility]    [address]  [address]  [facility] [address]   [phone]            Oct 19, 2018  8:00 AM CDT   MFM BPP SINGLE with [id number]   [facility] Maternal Fetal Medicine Ultrasound - [facility])    [address]  [facility] [address]   [phone]            Oct 19, 2018  8:30 AM CDT   Radiology MD with RONALD HOWARD MD   [facility] Fetal Medicine [facility])    [address] [facility] [address]  [facility] [address] [phone]           Please arrive at the time given for your first appointment. This visit is used internally to schedule the physician's time during your ultrasound.            Oct 24, 2018  1:30 PM CDT   ESTABLISHED PRENATAL with Harpal Koch MD   [facility]    [address]  [address]  [facility] [address]   [phone]            Oct 26, 2018  8:45 AM CDT   MFM US COMPRE SINGLE F/U with [id number]   [facility] Maternal Fetal Medicine Ultrasound - [facility])    [address]  [facility] [id number] [phone]           Wear comfortable clothes and leave your valuables at home.            Oct 26, 2018  9:15 AM CDT   Radiology MD with RONALD HOWARD MD   [facility] Maternal Fetal Medicine [facility])    [address]  [facility] [address]   [phone]           Please arrive at the time given for your first appointment. This visit is used internally to schedule the physician's time during your ultrasound.            Oct 31, 2018  1:30 PM CDT   ESTABLISHED PRENATAL with Harpal Koch MD   [facility]    [address]  [address]  [facility] [address]   [phone]            Nov 07, 2018  1:30 PM CST   ESTABLISHED PRENATAL with Harpal Koch MD   [facility])    [address]  [address]  [facility] [address] [phone]              Who to contact     If you have questions or need follow up information about today's clinic visit or your schedule please contact [facility]AL [facility]IC[facility] directly at [phone].  Normal or non-critical lab and imaging results will be communicated to you by MyChart, letter or phone within 4 business days after the clinic has received the results. If you do not hear from us within 7 days, please contact the clinic through [employer] or phone. If you have a critical or abnormal lab result, we will notify you by phone as soon as possible.  Submit refill requests through [employer] or call your pharmacy and they will forward the refill request to us. Please allow 3 business days for your refill to be completed.          Additional Information About Your Visit        Care EveryWhere ID     This is your Care EveryWhere ID. This could be used by other organizations to access your [place] medical records  [id number]        Your Vitals Were     Last Period                   01/18/2018 (Exact Date)            Blood Pressure from Last 3 Encounters:   10/04/18 98/54   09/19/18 102/56   06/27/18 116/77    Weight from Last 3 Encounters:   10/04/18 65.3 kg (144 lb)   09/19/18 64.7 kg (142 lb 11.2 oz)   06/27/18  54.4 kg (120 lb)              Today, you had the following     No orders found for display       Primary Care Provider Office Phone # Fax #    [address] [facility] [phone] [address] [facility] [address]        Equal Access to Services     KATHERINE KRAUS : Hadii haseeb ku hadasho Soomaali, waaxda luqadaha, qaybta kaalmada adeegyada, waxay taiin haykierann aderoslyn moralescarriedebbie xiao. So [facility] [phone].    ATENCIÓN: Si habla español, tiene a justice disposición servicios gratuitos de asistencia lingüística. Llame al [phone].    We comply with applicable federal civil rights laws and [place] laws. We do not discriminate on the basis of race, color, national origin, age, disability, sex, sexual orientation, or gender identity.            Thank you!     Thank you for choosing [facility]  for your care. Our goal is always to provide you with excellent care. Hearing back from our patients is one way we can continue to improve our services. Please take a few minutes to complete the written survey that you may receive in the mail after your visit with us. Thank you!             Your Updated Medication List - Protect others around you: Learn how to safely use, store and throw away your medicines at www.disposemymeds.org.          This list is accurate as of 10/12/18  4:11 PM.  Always use your most recent med list.                   Brand Name Dispense Instructions for use Diagnosis    prenatal multivitamin plus iron 27-0.8 MG Tabs per tablet      Take 1 tablet by mouth daily

## 2018-10-17 ENCOUNTER — PRENATAL OFFICE VISIT (OUTPATIENT)
Dept: OBGYN | Facility: CLINIC | Age: 23
End: 2018-10-17
Payer: COMMERCIAL

## 2018-10-17 VITALS — SYSTOLIC BLOOD PRESSURE: 104 MMHG | BODY MASS INDEX: 25.59 KG/M2 | DIASTOLIC BLOOD PRESSURE: 56 MMHG | WEIGHT: 147.9 LBS

## 2018-10-17 DIAGNOSIS — O09.899 HISTORY OF PRETERM DELIVERY, CURRENTLY PREGNANT: Primary | ICD-10-CM

## 2018-10-17 PROCEDURE — 87653 STREP B DNA AMP PROBE: CPT | Performed by: OBSTETRICS & GYNECOLOGY

## 2018-10-17 PROCEDURE — 99207 ZZC PRENATAL VISIT: CPT | Performed by: OBSTETRICS & GYNECOLOGY

## 2018-10-17 NOTE — NURSING NOTE
"Chief Complaint   Patient presents with     Prenatal Care   36w3d  GBS today    initial /56  Wt 147 lb 14.4 oz (67.1 kg)  LMP 01/18/2018 (Exact Date)  BMI 25.59 kg/m2 Estimated body mass index is 25.59 kg/(m^2) as calculated from the following:    Height as of 9/19/18: 5' 3.75\" (1.619 m).    Weight as of this encounter: 147 lb 14.4 oz (67.1 kg).  BP completed using cuff size regular.  Corrie Valladares CMA    "

## 2018-10-17 NOTE — MR AVS SNAPSHOT
After Visit Summary   10/17/2018    Desirae Kevin    MRN: [id number]           Patient Information     Date Of Birth          1995        Visit Information        Provider Department      10/17/2018 1:30 PM Harapl Koch MD [facility]        Today's Diagnoses     History of  delivery, currently pregnant    -  1       Follow-ups after your visit        Your next 10 appointments already scheduled     Oct 19, 2018  8:00 AM CDT   MFM BPP SINGLE with [id number]   [facility] - [facility] ([facility] [place])    [address]  [facility] [address]   [phone]            Oct 19, 2018  8:30 AM CDT   Radiology MD with RH LEE PATE   [facility] Maternal Fetal Medicine [facility])    [address]  [facility] [address]   [phone]           Please arrive at the time given for your first appointment. This visit is used internally to schedule the physician's time during your ultrasound.            Oct 24, 2018  1:30 PM CDT   ESTABLISHED PRENATAL with Harpal Koch MD   [facility] ([facility])    [address]  [facility] [address]   [phone]            Oct 26, 2018  8:45 AM CDT   MFM US COMPRE SINGLE F/U with RHMFMUSR2   [facility] Maternal Fetal Medicine Ultrasound - [facility])    [address]  [facility] [address]   [phone]           Wear comfortable clothes and leave your valuables at home.            Oct 26, 2018  9:15 AM CDT   Radiology MD with RH LEE PATE   [facility] Maternal Fetal Medicine [facility] ([facility] [place])    [address]  [facility] [address]   [phone]           Please arrive at the time given for your first appointment. This visit is used internally to schedule the physician's time during your ultrasound.             Oct 31, 2018  1:30 PM CDT   ESTABLISHED PRENATAL with Harpal Koch MD   [facility]    [address]  [facility] [address] [phone]            Nov 07, 2018  1:30 PM CST   ESTABLISHED PRENATAL with Harpal Koch MD   [facility] ([facility])    [address]  [address]  [facility] [address] [phone]              Who to contact     If you have questions or need follow up information about today's clinic visit or your schedule please contact [facility] directly at [phone].  Normal or non-critical lab and imaging results will be communicated to you by MyChart, letter or phone within 4 business days after the clinic has received the results. If you do not hear from us within 7 days, please contact the clinic through MyChart or phone. If you have a critical or abnormal lab result, we will notify you by phone as soon as possible.  Submit refill requests through [employer] or call your pharmacy and they will forward the refill request to us. Please allow 3 business days for your refill to be completed.          Additional Information About Your Visit        Care EveryWhere ID     This is your Care EveryWhere ID. This could be used by other organizations to access your [place] medical records  [id number]        Your Vitals Were     Last Period BMI (Body Mass Index)                01/18/2018 (Exact Date) 25.59 kg/m2           Blood Pressure from Last 3 Encounters:   10/17/18 104/56   10/04/18 98/54   09/19/18 102/56    Weight from Last 3 Encounters:   10/17/18 147 lb 14.4 oz (67.1 kg)   10/04/18 144 lb (65.3 kg)   09/19/18 142 lb 11.2 oz (64.7 kg)              We Performed the Following     Group B strep PCR        Primary Care Provider Office Phone # Fax #    [facility] [phone] [address] [place]  [address]  [facility] [address]        Equal Access to Services     KATHERINE KRAUS : Hadii aad ku hadmesfinmitra Tovar, washavonneda bill, qaybta zenon cerna. So [facility] [phone].    ATENCIÓN: Si habla español, tiene a justice disposición servicios gratuitos de asistencia lingüística. Llame al [phone].    We comply with applicable federal civil rights laws and [place] laws. We do not discriminate on the basis of race, color, national origin, age, disability, sex, sexual orientation, or gender identity.            Thank you!     Thank you for choosing [facility]  for your care. Our goal is always to provide you with excellent care. Hearing back from our patients is one way we can continue to improve our services. Please take a few minutes to complete the written survey that you may receive in the mail after your visit with us. Thank you!             Your Updated Medication List - Protect others around you: Learn how to safely use, store and throw away your medicines at www.disposemymeds.org.          This list is accurate as of 10/17/18  3:41 PM.  Always use your most recent med list.                   Brand Name Dispense Instructions for use Diagnosis    prenatal multivitamin plus iron 27-0.8 MG Tabs per tablet      Take 1 tablet by mouth daily

## 2018-10-18 LAB
GP B STREP DNA SPEC QL NAA+PROBE: NEGATIVE
SPECIMEN SOURCE: NORMAL

## 2018-10-19 ENCOUNTER — OFFICE VISIT (OUTPATIENT)
Dept: MATERNAL FETAL MEDICINE | Facility: CLINIC | Age: 23
End: 2018-10-19
Attending: OBSTETRICS & GYNECOLOGY
Payer: COMMERCIAL

## 2018-10-19 ENCOUNTER — HOSPITAL ENCOUNTER (OUTPATIENT)
Dept: ULTRASOUND IMAGING | Facility: CLINIC | Age: 23
Discharge: HOME | End: 2018-10-19
Attending: OBSTETRICS & GYNECOLOGY | Admitting: OBSTETRICS & GYNECOLOGY
Payer: COMMERCIAL

## 2018-10-19 DIAGNOSIS — O40.3XX0 POLYHYDRAMNIOS AFFECTING PREGNANCY IN THIRD TRIMESTER: ICD-10-CM

## 2018-10-19 DIAGNOSIS — O40.3XX0 POLYHYDRAMNIOS AFFECTING PREGNANCY IN THIRD TRIMESTER: Primary | ICD-10-CM

## 2018-10-19 PROCEDURE — 76819 FETAL BIOPHYS PROFIL W/O NST: CPT

## 2018-10-19 NOTE — PROGRESS NOTES
"Please see \"Imaging\" tab under \"Chart Review\" for details of today's US at the [facility].    Srinivas Long MD  Maternal-Fetal Medicine    "

## 2018-10-19 NOTE — MR AVS SNAPSHOT
After Visit Summary   10/19/2018    Desirae Kevin    MRN: [id number]           Patient Information     Date Of Birth          1995        Visit Information        Provider Department      10/19/2018 8:30 AM Srinivas Long MD [facility] Maternal Fetal Medicine [facility]        Today's Diagnoses     Polyhydramnios affecting pregnancy in third trimester    -  1       Follow-ups after your visit        Your next 10 appointments already scheduled     Oct 19, 2018  8:30 AM CDT   Radiology MD with Srinivas Long MD   [facility] Maternal Fetal Medicine [facility])    [address]  [facility] [address]   [phone]           Please arrive at the time given for your first appointment. This visit is used internally to schedule the physician's time during your ultrasound.            Oct 24, 2018  1:30 PM CDT   ESTABLISHED PRENATAL with Harpal Koch MD   [facility] ([facility])    [address]  [address]  [facility] [address]   [phone]            Oct 26, 2018  8:45 AM CDT   MFM US COMPRE SINGLE F/U with [id number]   [facility] Maternal Fetal Medicine Ultrasound - [facility])    [address]  [facility] [address] [phone]           Wear comfortable clothes and leave your valuables at home.            Oct 26, 2018  9:15 AM CDT   Radiology MD with  LEE PATE   [facility] Fetal Medicine [facility])    [address]  [facility] [address] [phone]           Please arrive at the time given for your first appointment. This visit is used internally to schedule the physician's time during your ultrasound.            Oct 31, 2018  1:30 PM CDT   ESTABLISHED PRENATAL with Harpal Koch MD   [facility] ([facility])    [address]  [address]  [facility] [address] [phone]            Nov 07, 2018  1:30 PM CST   ESTABLISHED PRENATAL with Harpal Koch MD   [facility])    [address]  [address]  [facility] [id number] [phone]              Who to contact     If you have questions or need follow up information about today's clinic visit or your schedule please contact [facility] directly at [phone].  Normal or non-critical lab and imaging results will be communicated to you by MyChart, letter or phone within 4 business days after the clinic has received the results. If you do not hear from us within 7 days, please contact the clinic through MyChart or phone. If you have a critical or abnormal lab result, we will notify you by phone as soon as possible.  Submit refill requests through True Pivot or call your pharmacy and they will forward the refill request to us. Please allow 3 business days for your refill to be completed.          Additional Information About Your Visit        Care EveryWhere ID     This is your Care EveryWhere ID. This could be used by other organizations to access your [place] medical records  [id number]        Your Vitals Were     Last Period                   01/18/2018 (Exact Date)            Blood Pressure from Last 3 Encounters:   10/17/18 104/56   10/04/18 98/54   09/19/18 102/56    Weight from Last 3 Encounters:   10/17/18 67.1 kg (147 lb 14.4 oz)   10/04/18 65.3 kg (144 lb)   09/19/18 64.7 kg (142 lb 11.2 oz)              Today, you had the following     No orders found for display       Primary Care Provider Office Phone # Fax #    [address] [phone] [address] [facility] [address]        Equal Access to Services     KATHERINE KRAUS : Eduar mooreo Soomaali, waaxda luqadaha, qaybta kaalmada aderoslynyada, zenon xiao. So  [facility] [phone].    ATENCIÓN: Si bcukla samara, tiene a justice disposición servicios gratuitos de asistencia lingüística. Cyrus al [phone].    We comply with applicable federal civil rights laws and [place] laws. We do not discriminate on the basis of race, color, national origin, age, disability, sex, sexual orientation, or gender identity.            Thank you!     Thank you for choosing [facility]  for your care. Our goal is always to provide you with excellent care. Hearing back from our patients is one way we can continue to improve our services. Please take a few minutes to complete the written survey that you may receive in the mail after your visit with us. Thank you!             Your Updated Medication List - Protect others around you: Learn how to safely use, store and throw away your medicines at www.disposemymeds.org.          This list is accurate as of 10/19/18  8:18 AM.  Always use your most recent med list.                   Brand Name Dispense Instructions for use Diagnosis    prenatal multivitamin plus iron 27-0.8 MG Tabs per tablet      Take 1 tablet by mouth daily

## 2018-10-24 ENCOUNTER — PRENATAL OFFICE VISIT (OUTPATIENT)
Dept: OBGYN | Facility: CLINIC | Age: 23
End: 2018-10-24
Payer: COMMERCIAL

## 2018-10-24 VITALS
SYSTOLIC BLOOD PRESSURE: 104 MMHG | DIASTOLIC BLOOD PRESSURE: 52 MMHG | BODY MASS INDEX: 26.11 KG/M2 | HEART RATE: 76 BPM | WEIGHT: 150.9 LBS

## 2018-10-24 DIAGNOSIS — Z87.59 HISTORY OF POLYHYDRAMNIOS: ICD-10-CM

## 2018-10-24 DIAGNOSIS — O09.899 HISTORY OF PRETERM DELIVERY, CURRENTLY PREGNANT: ICD-10-CM

## 2018-10-24 DIAGNOSIS — O40.3XX0 POLYHYDRAMNIOS IN THIRD TRIMESTER, ANTEPARTUM COMPLICATION: Primary | ICD-10-CM

## 2018-10-24 PROCEDURE — 99207 ZZC PRENATAL VISIT: CPT | Performed by: OBSTETRICS & GYNECOLOGY

## 2018-10-24 RX ORDER — DEXTROAMPHETAMINE/AMPHETAMINE 10 MG
CAPSULE, EXT RELEASE 24 HR ORAL
Refills: 0 | Status: ON HOLD | COMMUNITY
Start: 2018-03-02 | End: 2018-11-03

## 2018-10-24 NOTE — MR AVS SNAPSHOT
After Visit Summary   10/24/2018    Desirae Kevin    MRN: [id number]           Patient Information     Date Of Birth          1995        Visit Information        Provider Department      10/24/2018 1:30 PM Harpal Koch MD [facility]        Today's Diagnoses     Polyhydramnios in third trimester, antepartum complication    -  1    History of polyhydramnios        History of  delivery, currently pregnant           Follow-ups after your visit        Your next 10 appointments already scheduled     Oct 26, 2018  8:45 AM CDT   MFM US COMPRE SINGLE F/U with [id number]   [facility] Maternal Fetal Medicine Ultrasound - [facility])    [address]  [facility] [id number] [phone]           Wear comfortable clothes and leave your valuables at home.            Oct 26, 2018  9:15 AM CDT   Radiology MD with [facility]   [facility] Maternal Fetal Medicine [facility])    [address]  [facility] [id number] [phone]           Please arrive at the time given for your first appointment. This visit is used internally to schedule the physician's time during your ultrasound.            Oct 31, 2018  1:30 PM CDT   ESTABLISHED PRENATAL with Harpal Koch MD   [facility] ([facility])    [address]  [address]  [facility] [address] [phone]            2018  1:30 PM CST   ESTABLISHED PRENATAL with Harpal Koch MD   [facility] ([facility])    [address]  [address]  [facility] [address] [phone]              Who to contact     If you have questions or need follow up information about today's clinic visit or your schedule please contact [facility] directly at [phone].  Normal or non-critical lab and imaging results will be  communicated to you by MyChart, letter or phone within 4 business days after the clinic has received the results. If you do not hear from us within 7 days, please contact the clinic through MyChart or phone. If you have a critical or abnormal lab result, we will notify you by phone as soon as possible.  Submit refill requests through TouchTen or call your pharmacy and they will forward the refill request to us. Please allow 3 business days for your refill to be completed.          Additional Information About Your Visit        Care EveryWhere ID     This is your Care EveryWhere ID. This could be used by other organizations to access your [place] medical records  [id number]        Your Vitals Were     Pulse Last Period BMI (Body Mass Index)             76 01/18/2018 (Exact Date) 26.11 kg/m2          Blood Pressure from Last 3 Encounters:   10/24/18 104/52   10/17/18 104/56   10/04/18 98/54    Weight from Last 3 Encounters:   10/24/18 150 lb 14.4 oz (68.4 kg)   10/17/18 147 lb 14.4 oz (67.1 kg)   10/04/18 144 lb (65.3 kg)              Today, you had the following     No orders found for display       Primary Care Provider Office Phone # Fax #    [facility] [phone] [address] [facility] [address]        Equal Access to Services     KATHERINE KRAUS : Hadii haseeb ku hadasho Soomaali, waaxda luqadaha, qaybta kaalmada adeegyada, zenon ixao. So [facility] [phone].    ATENCIÓN: Si habla español, tiene a justice disposición servicios gratuitos de asistencia lingüística. Llame al [phone].    We comply with applicable federal civil rights laws and [place] laws. We do not discriminate on the basis of race, color, national origin, age, disability, sex, sexual orientation, or gender identity.            Thank you!     Thank you for choosing [facility]  for your care. Our goal is always to provide you with excellent care. Hearing  back from our patients is one way we can continue to improve our services. Please take a few minutes to complete the written survey that you may receive in the mail after your visit with us. Thank you!             Your Updated Medication List - Protect others around you: Learn how to safely use, store and throw away your medicines at www.disposemymeds.org.          This list is accurate as of 10/24/18  1:57 PM.  Always use your most recent med list.                   Brand Name Dispense Instructions for use Diagnosis    ADDERALL XR 10 MG per 24 hr capsule   Generic drug:  amphetamine-dextroamphetamine      TK 1 C PO D FOR ADHD        prenatal multivitamin plus iron 27-0.8 MG Tabs per tablet      Take 1 tablet by mouth daily

## 2018-10-24 NOTE — NURSING NOTE
"Chief Complaint   Patient presents with     Prenatal Care   37w3d  No specific concerns    Initial /52  Pulse 76  Wt 150 lb 14.4 oz (68.4 kg)  LMP 2018 (Exact Date)  BMI 26.11 kg/m2 Estimated body mass index is 26.11 kg/(m^2) as calculated from the following:    Height as of 18: 5' 3.75\" (1.619 m).    Weight as of this encounter: 150 lb 14.4 oz (68.4 kg).  BP completed using cuff size: regular    Questioned patient about current smoking habits.  Pt. has never smoked.          The following HM Due: NONE      Corrie Valladares CMA             "

## 2018-10-24 NOTE — PROGRESS NOTES
No problems.  BPP 8/8 in MFM.  Recommendation is for induction at 39+ weeks if undelivered.  RTC 1 week and will schedule induction then.

## 2018-10-26 ENCOUNTER — HOSPITAL ENCOUNTER (OUTPATIENT)
Dept: ULTRASOUND IMAGING | Facility: CLINIC | Age: 23
Discharge: HOME | End: 2018-10-26
Attending: OBSTETRICS & GYNECOLOGY | Admitting: OBSTETRICS & GYNECOLOGY
Payer: COMMERCIAL

## 2018-10-26 ENCOUNTER — OFFICE VISIT (OUTPATIENT)
Dept: MATERNAL FETAL MEDICINE | Facility: CLINIC | Age: 23
End: 2018-10-26
Attending: OBSTETRICS & GYNECOLOGY
Payer: COMMERCIAL

## 2018-10-26 DIAGNOSIS — O40.3XX0 POLYHYDRAMNIOS AFFECTING PREGNANCY IN THIRD TRIMESTER: ICD-10-CM

## 2018-10-26 DIAGNOSIS — O36.61X0: ICD-10-CM

## 2018-10-26 DIAGNOSIS — O40.3XX0 POLYHYDRAMNIOS AFFECTING PREGNANCY IN THIRD TRIMESTER: Primary | ICD-10-CM

## 2018-10-26 PROCEDURE — 76819 FETAL BIOPHYS PROFIL W/O NST: CPT | Performed by: OBSTETRICS & GYNECOLOGY

## 2018-10-26 PROCEDURE — 76816 OB US FOLLOW-UP PER FETUS: CPT

## 2018-10-26 NOTE — PROGRESS NOTES
"Please see \"Imaging\" tab under \"Chart Review\" for details of today's US.      La Jones, DO  Maternal-Fetal Medicine        "

## 2018-10-26 NOTE — MR AVS SNAPSHOT
After Visit Summary   10/26/2018    Desirae Kevin    MRN: [id number]           Patient Information     Date Of Birth          1995        Visit Information        Provider Department      10/26/2018 9:15 AM La Jones,  [facility] Maternal Fetal Medicine [facility]        Today's Diagnoses     Polyhydramnios affecting pregnancy in third trimester    -  1    Macrosomia affecting management of mother in first trimester, not applicable or unspecified fetus           Follow-ups after your visit        Your next 10 appointments already scheduled     Oct 31, 2018  1:30 PM CDT   ESTABLISHED PRENATAL with Harpal Koch MD   [facility]    [address]  [address]  [facility] [address]   [phone]            Nov 02, 2018  8:00 AM CDT   LEE STONER SINGLE with RONALDMFMUSANDRIA   [facility] Maternal Fetal Medicine Ultrasound [facility])    [address]  [facility] [address]   [phone]            Nov 02, 2018  8:30 AM CDT   Radiology MD with  LEE PATE   [facility] Maternal Fetal Medicine [facility] ([facility])    [address]  [facility] [address]   [phone]           Please arrive at the time given for your first appointment. This visit is used internally to schedule the physician's time during your ultrasound.            Nov 07, 2018  1:30 PM CST   ESTABLISHED PRENATAL with Harpal Koch MD   [facility] ([facility])    [address]  [address]  [facility] [address]   [phone]              Future tests that were ordered for you today     Open Future Orders        Priority Expected Expires Ordered    MFM BPP Single Routine 11/2/2018 8/26/2019 10/26/2018    MFM BPP Single Routine 11/9/2018 8/26/2019 10/26/2018            Who to contact     If you have questions or need follow up information about  today's clinic visit or your schedule please contact [facility] directly at [phone].  Normal or non-critical lab and imaging results will be communicated to you by MyChart, letter or phone within 4 business days after the clinic has received the results. If you do not hear from us within 7 days, please contact the clinic through MyChart or phone. If you have a critical or abnormal lab result, we will notify you by phone as soon as possible.  Submit refill requests through ActualSun or call your pharmacy and they will forward the refill request to us. Please allow 3 business days for your refill to be completed.          Additional Information About Your Visit        Care EveryWhere ID     This is your Care EveryWhere ID. This could be used by other organizations to access your [place] medical records  [id number]        Your Vitals Were     Last Period                   01/18/2018 (Exact Date)            Blood Pressure from Last 3 Encounters:   10/24/18 104/52   10/17/18 104/56   10/04/18 98/54    Weight from Last 3 Encounters:   10/24/18 68.4 kg (150 lb 14.4 oz)   10/17/18 67.1 kg (147 lb 14.4 oz)   10/04/18 65.3 kg (144 lb)               Primary Care Provider Office Phone # Fax #    [facility] [phone] [address] [facility] [address]        Equal Access to Services     KATHERINE KRAUS : Hadii haseeb aldana hadasho Sogonzalez, waaxda luqadaha, qaybta kaalmada adefriedada, zenon potter . So [facility] [phone].    ATENCIÓN: Si habla español, tiene a justice disposición servicios gratuitos de asistencia lingüística. Estefaniaame al [phone].    We comply with applicable federal civil rights laws and [place] laws. We do not discriminate on the basis of race, color, national origin, age, disability, sex, sexual orientation, or gender identity.            Thank you!     Thank you for choosing [facility]  for your care. Our goal is always to provide you with excellent care. Hearing back from our patients is one way we can continue to improve our services. Please take a few minutes to complete the written survey that you may receive in the mail after your visit with us. Thank you!             Your Updated Medication List - Protect others around you: Learn how to safely use, store and throw away your medicines at www.disposemymeds.org.          This list is accurate as of 10/26/18  9:59 AM.  Always use your most recent med list.                   Brand Name Dispense Instructions for use Diagnosis    ADDERALL XR 10 MG per 24 hr capsule   Generic drug:  amphetamine-dextroamphetamine      TK 1 C PO D FOR ADHD        prenatal multivitamin plus iron 27-0.8 MG Tabs per tablet      Take 1 tablet by mouth daily

## 2018-10-31 ENCOUNTER — TELEPHONE (OUTPATIENT)
Dept: OBGYN | Facility: CLINIC | Age: 23
End: 2018-10-31

## 2018-10-31 ENCOUNTER — PRENATAL OFFICE VISIT (OUTPATIENT)
Dept: OBGYN | Facility: CLINIC | Age: 23
End: 2018-10-31
Payer: COMMERCIAL

## 2018-10-31 VITALS — WEIGHT: 157.8 LBS | SYSTOLIC BLOOD PRESSURE: 116 MMHG | BODY MASS INDEX: 27.3 KG/M2 | DIASTOLIC BLOOD PRESSURE: 60 MMHG

## 2018-10-31 DIAGNOSIS — O40.3XX0 POLYHYDRAMNIOS IN THIRD TRIMESTER, ANTEPARTUM COMPLICATION: Primary | ICD-10-CM

## 2018-10-31 DIAGNOSIS — O09.899 HISTORY OF PRETERM DELIVERY, CURRENTLY PREGNANT: ICD-10-CM

## 2018-10-31 DIAGNOSIS — Z87.59 HISTORY OF POLYHYDRAMNIOS: ICD-10-CM

## 2018-10-31 PROCEDURE — 99207 ZZC PRENATAL VISIT: CPT | Performed by: OBSTETRICS & GYNECOLOGY

## 2018-10-31 PROCEDURE — 59425 ANTEPARTUM CARE ONLY: CPT | Performed by: OBSTETRICS & GYNECOLOGY

## 2018-10-31 NOTE — NURSING NOTE
"Chief Complaint   Patient presents with     Prenatal Care   38w3d  Discuss induction    Initial /60  Wt 157 lb 12.8 oz (71.6 kg)  LMP 2018 (Exact Date)  BMI 27.3 kg/m2 Estimated body mass index is 27.3 kg/(m^2) as calculated from the following:    Height as of 18: 5' 3.75\" (1.619 m).    Weight as of this encounter: 157 lb 12.8 oz (71.6 kg).  BP completed using cuff size: regular    Questioned patient about current smoking habits.  Pt. has never smoked.          The following HM Due: NONE    Corrie Valladares CMA             "

## 2018-10-31 NOTE — MR AVS SNAPSHOT
After Visit Summary   10/31/2018    Desirae Kevin    MRN: [id number]           Patient Information     Date Of Birth          1995        Visit Information        Provider Department      10/31/2018 1:30 PM Harpal Koch MD [facility]        Today's Diagnoses     Polyhydramnios in third trimester, antepartum complication    -  1    History of polyhydramnios        History of  delivery, currently pregnant           Follow-ups after your visit        Your next 10 appointments already scheduled     2018  8:00 AM CDT   MFBERNADETTE BPP SINGLE with [email or web address]   [facility] Maternal Fetal Medicine Ultrasound - [facility] ([facility])    [address]  [facility] [address] [phone]            2018  8:30 AM CDT   Radiology MD with  LEE PATE   [facility] Maternal Fetal Medicine [facility])    [address]  [facility] [address] [phone]           Please arrive at the time given for your first appointment. This visit is used internally to schedule the physician's time during your ultrasound.            2018  1:30 PM CST   ESTABLISHED PRENATAL with Harpal Koch MD   [facility] ([facility])    [address]  [address]  [facility] [address] [phone]              Who to contact     If you have questions or need follow up information about today's clinic visit or your schedule please contact [facility] directly at [phone].  Normal or non-critical lab and imaging results will be communicated to you by MyChart, letter or phone within 4 business days after the clinic has received the results. If you do not hear from us within 7 days, please contact the clinic through MyChart or phone. If you have a critical or abnormal lab result, we will notify you by phone as soon as possible.  Submit refill  requests through Swivl or call your pharmacy and they will forward the refill request to us. Please allow 3 business days for your refill to be completed.          Additional Information About Your Visit        Care EveryWhere ID     This is your Care EveryWhere ID. This could be used by other organizations to access your [place] medical records  [id number]        Your Vitals Were     Last Period BMI (Body Mass Index)                01/18/2018 (Exact Date) 27.3 kg/m2           Blood Pressure from Last 3 Encounters:   10/31/18 116/60   10/24/18 104/52   10/17/18 104/56    Weight from Last 3 Encounters:   10/31/18 157 lb 12.8 oz (71.6 kg)   10/24/18 150 lb 14.4 oz (68.4 kg)   10/17/18 147 lb 14.4 oz (67.1 kg)              Today, you had the following     No orders found for display       Primary Care Provider Office Phone # Fax #    [facility] [phone] [address] [facility] [address]        Equal Access to Services     KATHERINE KRAUS : Hadii aad ku hadasho Soomaali, waaxda luqadaha, qaybta kaalmada adeegyada, waxnahum malloy hayclint potter . So [facility] [phone].    ATENCIÓN: Si habla español, tiene a justice disposición servicios gratuitos de asistencia lingüística. Llame al [phone].    We comply with applicable federal civil rights laws and [place] laws. We do not discriminate on the basis of race, color, national origin, age, disability, sex, sexual orientation, or gender identity.            Thank you!     Thank you for choosing [facility]  for your care. Our goal is always to provide you with excellent care. Hearing back from our patients is one way we can continue to improve our services. Please take a few minutes to complete the written survey that you may receive in the mail after your visit with us. Thank you!             Your Updated Medication List - Protect others around you: Learn how to safely use, store and throw away  your medicines at www.disposemymeds.org.          This list is accurate as of 10/31/18  2:17 PM.  Always use your most recent med list.                   Brand Name Dispense Instructions for use Diagnosis    ADDERALL XR 10 MG per 24 hr capsule   Generic drug:  amphetamine-dextroamphetamine      TK 1 C PO D FOR ADHD        prenatal multivitamin plus iron 27-0.8 MG Tabs per tablet      Take 1 tablet by mouth daily

## 2018-10-31 NOTE — TELEPHONE ENCOUNTER
Induction smart phrase   Procedure: Induction  Date: 11/05/2018  Time: 7:30am  Hospital: [facility]  Orders faxed and the patient was advised to call [facility] [phone] labor and delivery department one hour prior to arrival.  Form faxed to L&D and added to Surgery Calendar  Corrie Valladares CMA / Scheduled by Dr. Koch

## 2018-11-02 ENCOUNTER — OFFICE VISIT (OUTPATIENT)
Dept: MATERNAL FETAL MEDICINE | Facility: CLINIC | Age: 23
End: 2018-11-02
Attending: OBSTETRICS & GYNECOLOGY
Payer: COMMERCIAL

## 2018-11-02 ENCOUNTER — HOSPITAL ENCOUNTER (OUTPATIENT)
Dept: ULTRASOUND IMAGING | Facility: CLINIC | Age: 23
Discharge: HOME | End: 2018-11-02
Attending: OBSTETRICS & GYNECOLOGY | Admitting: OBSTETRICS & GYNECOLOGY
Payer: COMMERCIAL

## 2018-11-02 ENCOUNTER — TELEPHONE (OUTPATIENT)
Dept: OBGYN | Facility: CLINIC | Age: 23
End: 2018-11-02

## 2018-11-02 DIAGNOSIS — O36.61X0: ICD-10-CM

## 2018-11-02 DIAGNOSIS — O40.3XX0 POLYHYDRAMNIOS AFFECTING PREGNANCY IN THIRD TRIMESTER: ICD-10-CM

## 2018-11-02 DIAGNOSIS — O40.3XX0 POLYHYDRAMNIOS AFFECTING PREGNANCY IN THIRD TRIMESTER: Primary | ICD-10-CM

## 2018-11-02 PROCEDURE — 76819 FETAL BIOPHYS PROFIL W/O NST: CPT

## 2018-11-02 NOTE — MR AVS SNAPSHOT
After Visit Summary   11/2/2018    Desirae Kevin    MRN: [id number]           Patient Information     Date Of Birth          1995        Visit Information        Provider Department      11/2/2018 8:30 AM La Jones, DO [facility] Maternal Fetal Medicine [facility]        Today's Diagnoses     Polyhydramnios affecting pregnancy in third trimester    -  1    Macrosomia affecting management of mother in first trimester, not applicable or unspecified fetus           Follow-ups after your visit        Your next 10 appointments already scheduled     Nov 07, 2018  1:30 PM CST   ESTABLISHED PRENATAL with Harpal Koch MD   [facility]    [address]  [address]  [facility] [address] [phone]              Who to contact     If you have questions or need follow up information about today's clinic visit or your schedule please contact [facility] directly at [phone].  Normal or non-critical lab and imaging results will be communicated to you by MyChart, letter or phone within 4 business days after the clinic has received the results. If you do not hear from us within 7 days, please contact the clinic through MyChart or phone. If you have a critical or abnormal lab result, we will notify you by phone as soon as possible.  Submit refill requests through [employer] or call your pharmacy and they will forward the refill request to us. Please allow 3 business days for your refill to be completed.          Additional Information About Your Visit        Care EveryWhere ID     This is your Care EveryWhere ID. This could be used by other organizations to access your [place] medical records  [id number]        Your Vitals Were     Last Period                   01/18/2018 (Exact Date)            Blood Pressure from Last 3 Encounters:   10/31/18 116/60   10/24/18 104/52   10/17/18 104/56    Weight from  Last 3 Encounters:   10/31/18 71.6 kg (157 lb 12.8 oz)   10/24/18 68.4 kg (150 lb 14.4 oz)   10/17/18 67.1 kg (147 lb 14.4 oz)              Today, you had the following     No orders found for display       Primary Care Provider Office Phone # Fax #    [address] [facility] [phone] [address] [facility] [address]        Equal Access to Services     KATHERINE KRAUS : Hadii aad ku hadasho Soomaali, waaxda luqadaha, qaybta kaalmada adeegyada, waxay idiin hayaan adeeg mark xiao. So [facility] [phone].    ATENCIÓN: Si habla español, tiene a justice disposición servicios gratuitos de asistencia lingüística. [facility] [phone].    We comply with applicable federal civil rights laws and [place] laws. We do not discriminate on the basis of race, color, national origin, age, disability, sex, sexual orientation, or gender identity.            Thank you!     Thank you for choosing [facility]  for your care. Our goal is always to provide you with excellent care. Hearing back from our patients is one way we can continue to improve our services. Please take a few minutes to complete the written survey that you may receive in the mail after your visit with us. Thank you!             Your Updated Medication List - Protect others around you: Learn how to safely use, store and throw away your medicines at www.disposemymeds.org.          This list is accurate as of 11/2/18  8:36 AM.  Always use your most recent med list.                   Brand Name Dispense Instructions for use Diagnosis    ADDERALL XR 10 MG per 24 hr capsule   Generic drug:  amphetamine-dextroamphetamine      TK 1 C PO D FOR ADHD        prenatal multivitamin plus iron 27-0.8 MG Tabs per tablet      Take 1 tablet by mouth daily

## 2018-11-02 NOTE — TELEPHONE ENCOUNTER
Pt calling. She is 38w5d and had an US done this morning at [facility]. Her SHONDA is now 38, and she wanted to make you aware. She is currently scheduled to be induced on Monday. She wanted to make you aware in case you wanted to change the plan at all.        Lorie Vazquez RN

## 2018-11-02 NOTE — NURSING NOTE
Reviewed US findings with patients. Reinforced teaching about signs of labor. Encouraged urgent follow up with primary OB provider in event of any changes, increased contractions, leaking fluid.

## 2018-11-03 ENCOUNTER — HOSPITAL ENCOUNTER (INPATIENT)
Facility: CLINIC | Age: 23
LOS: 2 days | Discharge: HOME | End: 2018-11-05
Attending: OBSTETRICS & GYNECOLOGY | Admitting: OBSTETRICS & GYNECOLOGY
Payer: COMMERCIAL

## 2018-11-03 LAB
ABO + RH BLD: NORMAL
ABO + RH BLD: NORMAL
BLD GP AB SCN SERPL QL: NORMAL
BLOOD BANK CMNT PATIENT-IMP: NORMAL
HGB BLD-MCNC: 10.6 G/DL (ref 11.7–15.7)
SPECIMEN EXP DATE BLD: NORMAL

## 2018-11-03 PROCEDURE — 25000128 H RX IP 250 OP 636: Performed by: OBSTETRICS & GYNECOLOGY

## 2018-11-03 PROCEDURE — 86850 RBC ANTIBODY SCREEN: CPT | Performed by: OBSTETRICS & GYNECOLOGY

## 2018-11-03 PROCEDURE — 10907ZC DRAINAGE OF AMNIOTIC FLUID, THERAPEUTIC FROM PRODUCTS OF CONCEPTION, VIA NATURAL OR ARTIFICIAL OPENING: ICD-10-PCS | Performed by: OBSTETRICS & GYNECOLOGY

## 2018-11-03 PROCEDURE — 86780 TREPONEMA PALLIDUM: CPT | Performed by: OBSTETRICS & GYNECOLOGY

## 2018-11-03 PROCEDURE — 86901 BLOOD TYPING SEROLOGIC RH(D): CPT | Performed by: OBSTETRICS & GYNECOLOGY

## 2018-11-03 PROCEDURE — 85018 HEMOGLOBIN: CPT | Performed by: OBSTETRICS & GYNECOLOGY

## 2018-11-03 PROCEDURE — 25000132 ZZH RX MED GY IP 250 OP 250 PS 637: Performed by: OBSTETRICS & GYNECOLOGY

## 2018-11-03 PROCEDURE — 0HQ9XZZ REPAIR PERINEUM SKIN, EXTERNAL APPROACH: ICD-10-PCS | Performed by: OBSTETRICS & GYNECOLOGY

## 2018-11-03 PROCEDURE — 59410 OBSTETRICAL CARE: CPT | Performed by: OBSTETRICS & GYNECOLOGY

## 2018-11-03 PROCEDURE — 36415 COLL VENOUS BLD VENIPUNCTURE: CPT | Performed by: OBSTETRICS & GYNECOLOGY

## 2018-11-03 PROCEDURE — 72200001 ZZH LABOR CARE VAGINAL DELIVERY SINGLE

## 2018-11-03 PROCEDURE — 86900 BLOOD TYPING SEROLOGIC ABO: CPT | Performed by: OBSTETRICS & GYNECOLOGY

## 2018-11-03 PROCEDURE — 25000125 ZZHC RX 250: Performed by: OBSTETRICS & GYNECOLOGY

## 2018-11-03 PROCEDURE — 12000029 ZZH R&B OB INTERMEDIATE

## 2018-11-03 RX ORDER — HYDROCORTISONE 2.5 %
CREAM (GRAM) TOPICAL 3 TIMES DAILY PRN
Status: DISCONTINUED | OUTPATIENT
Start: 2018-11-03 | End: 2018-11-05 | Stop reason: HOSPADM

## 2018-11-03 RX ORDER — SODIUM CHLORIDE, SODIUM LACTATE, POTASSIUM CHLORIDE, CALCIUM CHLORIDE 600; 310; 30; 20 MG/100ML; MG/100ML; MG/100ML; MG/100ML
INJECTION, SOLUTION INTRAVENOUS CONTINUOUS
Status: DISCONTINUED | OUTPATIENT
Start: 2018-11-03 | End: 2018-11-03

## 2018-11-03 RX ORDER — OXYTOCIN 10 [USP'U]/ML
10 INJECTION, SOLUTION INTRAMUSCULAR; INTRAVENOUS
Status: DISCONTINUED | OUTPATIENT
Start: 2018-11-03 | End: 2018-11-03

## 2018-11-03 RX ORDER — CARBOPROST TROMETHAMINE 250 UG/ML
250 INJECTION, SOLUTION INTRAMUSCULAR
Status: DISCONTINUED | OUTPATIENT
Start: 2018-11-03 | End: 2018-11-03

## 2018-11-03 RX ORDER — IBUPROFEN 800 MG/1
800 TABLET, FILM COATED ORAL
Status: DISCONTINUED | OUTPATIENT
Start: 2018-11-03 | End: 2018-11-03

## 2018-11-03 RX ORDER — NALOXONE HYDROCHLORIDE 0.4 MG/ML
.1-.4 INJECTION, SOLUTION INTRAMUSCULAR; INTRAVENOUS; SUBCUTANEOUS
Status: DISCONTINUED | OUTPATIENT
Start: 2018-11-03 | End: 2018-11-05 | Stop reason: HOSPADM

## 2018-11-03 RX ORDER — OXYTOCIN 10 [USP'U]/ML
10 INJECTION, SOLUTION INTRAMUSCULAR; INTRAVENOUS
Status: DISCONTINUED | OUTPATIENT
Start: 2018-11-03 | End: 2018-11-05 | Stop reason: HOSPADM

## 2018-11-03 RX ORDER — LIDOCAINE 40 MG/G
CREAM TOPICAL
Status: DISCONTINUED | OUTPATIENT
Start: 2018-11-03 | End: 2018-11-03

## 2018-11-03 RX ORDER — OXYCODONE AND ACETAMINOPHEN 5; 325 MG/1; MG/1
1 TABLET ORAL
Status: DISCONTINUED | OUTPATIENT
Start: 2018-11-03 | End: 2018-11-03

## 2018-11-03 RX ORDER — OXYTOCIN/0.9 % SODIUM CHLORIDE 30/500 ML
1-24 PLASTIC BAG, INJECTION (ML) INTRAVENOUS CONTINUOUS
Status: DISCONTINUED | OUTPATIENT
Start: 2018-11-03 | End: 2018-11-03

## 2018-11-03 RX ORDER — ACETAMINOPHEN 325 MG/1
650 TABLET ORAL EVERY 4 HOURS PRN
Status: DISCONTINUED | OUTPATIENT
Start: 2018-11-03 | End: 2018-11-05 | Stop reason: HOSPADM

## 2018-11-03 RX ORDER — HYDROMORPHONE HYDROCHLORIDE 1 MG/ML
0.3 INJECTION, SOLUTION INTRAMUSCULAR; INTRAVENOUS; SUBCUTANEOUS
Status: DISCONTINUED | OUTPATIENT
Start: 2018-11-03 | End: 2018-11-05 | Stop reason: HOSPADM

## 2018-11-03 RX ORDER — METHYLERGONOVINE MALEATE 0.2 MG/ML
200 INJECTION INTRAVENOUS
Status: DISCONTINUED | OUTPATIENT
Start: 2018-11-03 | End: 2018-11-03

## 2018-11-03 RX ORDER — OXYTOCIN/0.9 % SODIUM CHLORIDE 30/500 ML
100 PLASTIC BAG, INJECTION (ML) INTRAVENOUS CONTINUOUS
Status: DISCONTINUED | OUTPATIENT
Start: 2018-11-03 | End: 2018-11-05 | Stop reason: HOSPADM

## 2018-11-03 RX ORDER — OXYTOCIN/0.9 % SODIUM CHLORIDE 30/500 ML
100-340 PLASTIC BAG, INJECTION (ML) INTRAVENOUS CONTINUOUS PRN
Status: DISCONTINUED | OUTPATIENT
Start: 2018-11-03 | End: 2018-11-03

## 2018-11-03 RX ORDER — ONDANSETRON 2 MG/ML
4 INJECTION INTRAMUSCULAR; INTRAVENOUS EVERY 6 HOURS PRN
Status: DISCONTINUED | OUTPATIENT
Start: 2018-11-03 | End: 2018-11-03

## 2018-11-03 RX ORDER — FENTANYL CITRATE 50 UG/ML
50-100 INJECTION, SOLUTION INTRAMUSCULAR; INTRAVENOUS
Status: DISCONTINUED | OUTPATIENT
Start: 2018-11-03 | End: 2018-11-03

## 2018-11-03 RX ORDER — NALBUPHINE HYDROCHLORIDE 10 MG/ML
10-20 INJECTION, SOLUTION INTRAMUSCULAR; INTRAVENOUS; SUBCUTANEOUS
Status: DISCONTINUED | OUTPATIENT
Start: 2018-11-03 | End: 2018-11-03

## 2018-11-03 RX ORDER — ACETAMINOPHEN 325 MG/1
650 TABLET ORAL EVERY 4 HOURS PRN
Status: DISCONTINUED | OUTPATIENT
Start: 2018-11-03 | End: 2018-11-03

## 2018-11-03 RX ORDER — AMOXICILLIN 250 MG
1 CAPSULE ORAL 2 TIMES DAILY
Status: DISCONTINUED | OUTPATIENT
Start: 2018-11-03 | End: 2018-11-05 | Stop reason: HOSPADM

## 2018-11-03 RX ORDER — OXYTOCIN/0.9 % SODIUM CHLORIDE 30/500 ML
340 PLASTIC BAG, INJECTION (ML) INTRAVENOUS CONTINUOUS PRN
Status: DISCONTINUED | OUTPATIENT
Start: 2018-11-03 | End: 2018-11-05 | Stop reason: HOSPADM

## 2018-11-03 RX ORDER — IBUPROFEN 800 MG/1
800 TABLET, FILM COATED ORAL EVERY 6 HOURS PRN
Status: DISCONTINUED | OUTPATIENT
Start: 2018-11-03 | End: 2018-11-05 | Stop reason: HOSPADM

## 2018-11-03 RX ORDER — LANOLIN 100 %
OINTMENT (GRAM) TOPICAL
Status: DISCONTINUED | OUTPATIENT
Start: 2018-11-03 | End: 2018-11-05 | Stop reason: HOSPADM

## 2018-11-03 RX ORDER — NALOXONE HYDROCHLORIDE 0.4 MG/ML
.1-.4 INJECTION, SOLUTION INTRAMUSCULAR; INTRAVENOUS; SUBCUTANEOUS
Status: DISCONTINUED | OUTPATIENT
Start: 2018-11-03 | End: 2018-11-03

## 2018-11-03 RX ORDER — AMOXICILLIN 250 MG
2 CAPSULE ORAL 2 TIMES DAILY
Status: DISCONTINUED | OUTPATIENT
Start: 2018-11-03 | End: 2018-11-05 | Stop reason: HOSPADM

## 2018-11-03 RX ORDER — BISACODYL 10 MG
10 SUPPOSITORY, RECTAL RECTAL DAILY PRN
Status: DISCONTINUED | OUTPATIENT
Start: 2018-11-05 | End: 2018-11-05 | Stop reason: HOSPADM

## 2018-11-03 RX ADMIN — IBUPROFEN 800 MG: 800 TABLET ORAL at 12:39

## 2018-11-03 RX ADMIN — SODIUM CHLORIDE, POTASSIUM CHLORIDE, SODIUM LACTATE AND CALCIUM CHLORIDE: 600; 310; 30; 20 INJECTION, SOLUTION INTRAVENOUS at 09:14

## 2018-11-03 RX ADMIN — FENTANYL CITRATE 100 MCG: 50 INJECTION, SOLUTION INTRAMUSCULAR; INTRAVENOUS at 11:57

## 2018-11-03 RX ADMIN — IBUPROFEN 800 MG: 800 TABLET ORAL at 18:31

## 2018-11-03 RX ADMIN — SENNOSIDES AND DOCUSATE SODIUM 1 TABLET: 8.6; 5 TABLET ORAL at 19:38

## 2018-11-03 RX ADMIN — ACETAMINOPHEN 650 MG: 325 TABLET, FILM COATED ORAL at 19:40

## 2018-11-03 RX ADMIN — OXYTOCIN-SODIUM CHLORIDE 0.9% IV SOLN 30 UNIT/500ML 2 MILLI-UNITS/MIN: 30-0.9/5 SOLUTION at 09:15

## 2018-11-03 RX ADMIN — ACETAMINOPHEN 650 MG: 325 TABLET, FILM COATED ORAL at 14:44

## 2018-11-03 NOTE — H&P
"     OB Admit History & Physical  November 3, 2018    Elias Kevin  [id number]    History of Present Illness:   Elias Kevin  Is a 23 year old female who is  being seen for induction of labor due to polyhydramnios and macrosomia..  Her Estimated Date of Delivery is 2018, and gestational age is 38w6d.  Her last menstrual period was Patient's last menstrual period was 2018 (exact date)..     Pregnancy has been complicated by polyhydramnios with normal fetal anatomy and no GDM.  Prior pregnancies also were complicated by polyhydramnios w/o GDM.      U/S in MFM 10/26 with EFW 4375gms  BPP in MFM yesterday  with SHONDA 38cm    Cervix favorable for induction and pros/cons risks/benefits associated with awaiting spont labor vs induction discussed.  Will proceed with induction today.      OB History:   Estimated body mass index is 27.16 kg/(m^2) as calculated from the following:    Height as of this encounter: 1.619 m (5' 3.75\").    Weight as of this encounter: 71.2 kg (157 lb).  Obstetric History       T2      L3     SAB0   TAB0   Ectopic0   Multiple0   Live Births3       # Outcome Date GA Lbr Tom/2nd Weight Sex Delivery Anes PTL Lv   4 Current            3 Term 11/06/15 39w5d 00:57 / 00:11 4.02 kg (8 lb 13.8 oz) F Vag-Spont IV REGIONAL,None N SUZE      Apgar1:  7                Apgar5: 9   2  14 36w3d 03:05 / 00:09 3.7 kg (8 lb 2.5 oz) F Vag-Spont EPI Y SUZE      Name: ARLEEN,BABY1 ELIAS LYNN      Apgar1:  8                Apgar5: 9   1 Term 09 39w0d   M   Y SUZE          Her prenatal course has been  complicated by polyhydramnios and macrosomia.  Estimated fetal weight =  4500gm    Past Medical History:   Past Medical History:   Diagnosis Date     Anemia 2015    iron supplements     Migraine      Past Surgical History:   Procedure Laterality Date     NO HISTORY OF SURGERY  2018       Medications:   No current outpatient prescriptions on file. " "      Allergies:   Review of patient's allergies indicates no known allergies.          Physical Exam:  Vitals:  Blood pressure 105/67, temperature 98  F (36.7  C), temperature source Oral, resp. rate 18, height 1.619 m (5' 3.75\"), weight 71.2 kg (157 lb), last menstrual period 01/18/2018, unknown if currently breastfeeding.   FHT rate at 140 bpm ,with contractions every  4-5min, mild  GEN: Alert Awake in NAD  Fundal ht 44cm  Cervix and Dilation:  4/70/-4/Vtx  Ext NT      Labs:   Hemoglobin   Date Value Ref Range Status   08/24/2018 10.9 (A) 11.7 - 15.7 g/dL Final   03/16/2018 13.7 11.7 - 15.7 g/dL Final     No results found for: RUBELLAABIGG   Lab Results   Component Value Date    ABO A 03/16/2018           Lab Results   Component Value Date    RH Pos 03/16/2018      GBS Neg    Assessment and Plan:   Intrauterine pregnancy at 38w6d  complicated by polyhydramnios and macrosomia who presents for induction of labor.    1. Admit to L&D  2. Pitocin induction per protocol  3.  AROM when ctx stronger and head better applied          Harpal Koch   Dept of OB/GYN  November 3, 2018   "

## 2018-11-03 NOTE — PLAN OF CARE
Data: Desirae Kevin transferred to 426 via wheelchair at 1420. Baby transferred via parent's arms.  Action: Receiving unit notified of transfer: Yes. Patient and family notified of room change.  Belongings sent to receiving unit. Accompanied by Registered Nurse. Oriented patient to surroundings. Call light within reach. ID bands double-checked with receiving RN.  Response: Patient tolerated transfer and is stable.

## 2018-11-03 NOTE — PLAN OF CARE
Data: Patient presented to [facility]: 11/3/2018  7:41 AM.  Patient admitted for induction for polyhydramnios. Patient is a .  Prenatal record reviewed. Pregnancy has been complicated by polyhydramnios.  Gestational Age 38w6d. VSS. Fetal movement present. Patient denies leaking of vaginal fluid/rupture of membranes, vaginal bleeding, abdominal pain, pelvic pressure, nausea, vomiting, headache, visual disturbances, epigastric or URQ pain, significant edema. Support person is present.   Action: Verbal consent for EFM.  Admission assessment completed. Bill of rights reviewed.  Response: Patient verbalized agreement with plan. Dr Harpal Koch updated, inpatient and induction orders received. Will plan to AROM after Pitocin is started.

## 2018-11-03 NOTE — L&D DELIVERY NOTE
Desirae Kevin is a 23 year old-year-old  female,  4 para 3 with LMP 18 and EDC 18, who was admitted for medical induction of labor at 38 weeks 6days with polyhydramnios and fetal macrosomia.    Her prenatal care was at the [facility] in [place]. Prenatal course was complicated by polyhydramnios and fetal macrosomia. Vaginal Group B Streptococcus culture was negative.  Patient underwent artificial rupture of membranes at 1025, yielding copious amounts of clear fluid.  Her estimated fetal weight was 4500gms.  Oxytocin augmentation was initiated per standard protocol for absent labor.  Patient declined labor analgesia  The patient achieved complete dilation at 1200. She went on to deliver a 10 #, 0 oz male infant at 1210 by . Apgars were  2 at one minute and 9 at five minutes. The fetal oropharynx was bulb suctioned on the perineum.  There was a tight  nuchal cord, clamped and cut prior to delivery of the anterior shoulder. The placenta delivered spontaneously and intact at 1212.  The patient had an intact perineum. A small left labial laceration did not require repair..  Duration of the first stage of labor was 30 minutes, second stage 10 minutes, and third stage 2 minutes.  The patient has elected to Breastfeed her infant.  Dr. Gomez Koch

## 2018-11-03 NOTE — IP AVS SNAPSHOT
[facility]    [address]    [facility] [address]    Phone:  [phone]    Fax:  [phone]                                       After Visit Summary   11/3/2018    Desirae Kevin    MRN: [id number]           After Visit Summary Signature Page     I have received my discharge instructions, and my questions have been answered. I have discussed any challenges I see with this plan with the nurse or doctor.    ..........................................................................................................................................  Patient/Patient Representative Signature      ..........................................................................................................................................  Patient Representative Print Name and Relationship to Patient    ..................................................               ................................................  Date                                   Time    ..........................................................................................................................................  Reviewed by Signature/Title    ...................................................              ..............................................  Date                                               Time          22EPIC Rev 08/18

## 2018-11-03 NOTE — PROGRESS NOTES
OBPN    AROM clear fluid - large amount    FHR Category 1 with ctx Q3 mins but mild  Continue pitocin induction    Monitor progress    Gomez Koch MD

## 2018-11-03 NOTE — PROVIDER NOTIFICATION
11/03/18 1024   Provider Notification   Provider Name/Title Dr. Koch   Method of Notification At Bedside   Request Evaluate in Person   Notification Reason Membrane Status     MD at bedside for AROM for large (!!!) amount of clear fluid. FHT category 1. UC every 2-2.5 minutes, palpating mild. MD will remain in-house.

## 2018-11-03 NOTE — PROVIDER NOTIFICATION
11/03/18 1200   FHR   Monitor External US   Variability 6-25 BPM   Baseline Rate (Fetus A) 145 bpm   Baseline Classification Normal   Accelerations Present   Decelerations PD;VD;Recurrent  (alphonso 60; 5 min)   Provider Notification   Provider Name/Title August PATE   Method of Notification Electronic Page   Request Attend Delivery     MD paged to attend delivery and for FHT stat. Prolonged deceleration continues despite patient position changed, placed in trendelenburg, 02 applied, pitocin discontinued. Patient involuntary pushing.

## 2018-11-03 NOTE — IP AVS SNAPSHOT
MRN:[id number]                      After Visit Summary   11/3/2018    Desirae Kevin    MRN: [id number]           Thank you!     Thank you for choosing [facility] for your care. Our goal is always to provide you with excellent care. Hearing back from our patients is one way we can continue to improve our services. Please take a few minutes to complete the written survey that you may receive in the mail after you visit. If you would like to speak to someone directly about your visit please contact Patient Relations at [phone]. Thank you!          Patient Information     Date Of Birth          1995        Designated Caregiver       Most Recent Value    Caregiver    Will someone help with your care after discharge? no    Name of designated caregiver Donato    Phone number of caregiver see facesheet      About your hospital stay     You were admitted on:  November 3, 2018 You last received care in the:  [facility]    You were discharged on:  November 5, 2018        Reason for your hospital stay       Desirae Kevin  Is a 23 year old female who was admitted for induction of labor due to polyhydramnios and macrosomia.                  Who to Call     For medical emergencies, please call 911.  For non-urgent questions about your medical care, please call your primary care provider or clinic, [phone]          Attending Provider     Provider Specialty    Harpal Koch MD OB/Gyn       Primary Care Provider Office Phone # Fax #    [address] [facility] [phone]      After Care Instructions     Activity       Your activity upon discharge: activity as tolerated, no sex for 6 weeks and no driving while on analgesics            Diet       Follow this diet upon discharge: Orders Placed This Encounter      Room Service      Room Service      Regular Diet Adult                  Follow-up Appointments     Follow-up and  recommended labs and tests        Follow up with primary care provider, Dr Koch, within 6 wks, for hospital follow- up. No follow up labs or test are needed.                  Your next 10 appointments already scheduled     Nov 07, 2018  1:30 PM CST   ESTABLISHED PRENATAL with Harpal Koch MD   [facility]    [address]  [address]  [facility] [address]   [phone]              Further instructions from your care team       LACTATION: [phone]    Please follow-up with your MD in 6 weeks.     Take your tempature twice daily for 3 days and call if > 100.4  Nothing in vagina for 6 wks  Continue taking prenatal MVI daily for 1 month    Postpartum Vaginal Delivery Instructions    Activity       Ask family and friends for help when you need it.    Do not place anything in your vagina for 6 weeks.    You are not restricted on other activities, but take it easy for a few weeks to allow your body to recover from delivery.  You are able to do any activities you feel up to that point.    No driving until you have stopped taking your pain medications (usually two weeks after delivery).     Call your health care provider if you have any of these symptoms:       Increased pain, swelling, redness, or fluid around your stiches from an episiotomy or perineal tear.    A fever above 100.4 F (38 C) with or without chills when placing a thermometer under your tongue.    You soak a sanitary pad with blood within 1 hour, or you see blood clots larger than a golf ball.    Bleeding that lasts more than 6 weeks.    Vaginal discharge that smells bad.    Severe pain, cramping or tenderness in your lower belly area.    A need to urinate more frequently (use the toilet more often), more urgently (use the toilet very quickly), or it burns when you urinate.    Nausea and vomiting.    Redness, swelling or pain around a vein in your leg.    Problems breastfeeding or a red  "or painful area on your breast.    Chest pain and cough or are gasping for air.    Problems coping with sadness, anxiety, or depression.  If you have any concerns about hurting yourself or the baby, call your provider immediately.     You have questions or concerns after you return home.     Keep your hands clean:  Always wash your hands before touching your perineal area and stitches.  This helps reduce your risk of infection.  If your hands aren't dirty, you may use an alcohol hand-rub to clean your hands. Keep your nails clean and short.        Pending Results     No orders found from 11/1/2018 to 11/4/2018.            Statement of Approval     Ordered          11/05/18 0845  I have reviewed and agree with all the recommendations and orders detailed in this document.  EFFECTIVE NOW     Approved and electronically signed by:  Nigel Bustos MD             Admission Information     Date & Time Provider Department Dept. Phone    11/3/2018 Harpal Koch MD [place] [facility] [phone]      Your Vitals Were     Blood Pressure Pulse Temperature Respirations Height Weight    119/77 64 97.9  F (36.6  C) (Oral) 18 1.619 m (5' 3.75\") 71.2 kg (157 lb)    Last Period BMI (Body Mass Index)                01/18/2018 (Exact Date) 27.16 kg/m2          Care EveryWhere ID     This is your Care EveryWhere ID. This could be used by other organizations to access your [place] medical records  [id number]        Equal Access to Services     KATHERINE KRAUS AH: Hadii haseeb mooreo Sogonzalez, waaxda luqadaha, qaybta kaalmada aderoslynyada, zenon potter . So [facility] [phone].    ATENCIÓN: Si habla español, tiene a justice disposición servicios gratuitos de asistencia lingüística. Llame al [phone].    We comply with applicable federal civil rights laws and [place] laws. We do not discriminate on the basis of race, color, national origin, age, disability, sex, sexual orientation, or gender " identity.               Review of your medicines      START taking        Dose / Directions    acetaminophen 325 MG tablet   Commonly known as:  TYLENOL   Used for:  Vaginal delivery        Dose:  650 mg   Take 2 tablets (650 mg) by mouth every 4 hours as needed for mild pain or fever (greater than or equal to 38? C /100.4? F (oral) or 38.5? C/ 101.4? F (core).)   Quantity:  100 tablet   Refills:  0         CONTINUE these medicines which have NOT CHANGED        Dose / Directions    prenatal multivitamin plus iron 27-0.8 MG Tabs per tablet        Dose:  1 tablet   Take 1 tablet by mouth daily   Refills:  0            Where to get your medicines      Some of these will need a paper prescription and others can be bought over the counter. Ask your nurse if you have questions.     You don't need a prescription for these medications     acetaminophen 325 MG tablet                Protect others around you: Learn how to safely use, store and throw away your medicines at www.disposemymeds.org.             Medication List: This is a list of all your medications and when to take them. Check marks below indicate your daily home schedule. Keep this list as a reference.      Medications           Morning Afternoon Evening Bedtime As Needed    acetaminophen 325 MG tablet   Commonly known as:  TYLENOL   Take 2 tablets (650 mg) by mouth every 4 hours as needed for mild pain or fever (greater than or equal to 38? C /100.4? F (oral) or 38.5? C/ 101.4? F (core).)   Last time this was given:  650 mg on 11/5/2018  8:51 AM                                prenatal multivitamin plus iron 27-0.8 MG Tabs per tablet   Take 1 tablet by mouth daily

## 2018-11-04 LAB — T PALLIDUM AB SER QL: NONREACTIVE

## 2018-11-04 PROCEDURE — 25000132 ZZH RX MED GY IP 250 OP 250 PS 637: Performed by: OBSTETRICS & GYNECOLOGY

## 2018-11-04 PROCEDURE — 12000027 ZZH R&B OB

## 2018-11-04 RX ADMIN — IBUPROFEN 800 MG: 800 TABLET ORAL at 14:40

## 2018-11-04 RX ADMIN — ACETAMINOPHEN 650 MG: 325 TABLET, FILM COATED ORAL at 16:16

## 2018-11-04 RX ADMIN — IBUPROFEN 800 MG: 800 TABLET ORAL at 08:17

## 2018-11-04 RX ADMIN — ACETAMINOPHEN 650 MG: 325 TABLET, FILM COATED ORAL at 02:58

## 2018-11-04 RX ADMIN — SENNOSIDES AND DOCUSATE SODIUM 1 TABLET: 8.6; 5 TABLET ORAL at 21:11

## 2018-11-04 RX ADMIN — IBUPROFEN 800 MG: 800 TABLET ORAL at 21:11

## 2018-11-04 RX ADMIN — ACETAMINOPHEN 650 MG: 325 TABLET, FILM COATED ORAL at 11:23

## 2018-11-04 RX ADMIN — IBUPROFEN 800 MG: 800 TABLET ORAL at 00:48

## 2018-11-04 RX ADMIN — SENNOSIDES AND DOCUSATE SODIUM 1 TABLET: 8.6; 5 TABLET ORAL at 08:17

## 2018-11-04 NOTE — PLAN OF CARE
Problem: Patient Care Overview  Goal: Plan of Care/Patient Progress Review  Outcome: Improving  UAL. VSS. States she is voiding without difficulty. Caring for baby and self independently.  Taking tylenol and ibuprofen for generalized discomfort and some uterine cramping and states this has been adequate. Meeting expected outcomes.

## 2018-11-04 NOTE — PLAN OF CARE
Problem: Patient Care Overview  Goal: Plan of Care/Patient Progress Review  Outcome: No Change  VSS and pt meeting expected goals for the shift. Using ibuprofen and tylenol for pain. Independent with self and infant cares. FOB at bedside and attentive to mom/baby.

## 2018-11-04 NOTE — PROGRESS NOTES
[facility] Obstetrics Post-Partum Progress Note          Assessment and Plan:    Assessment:   Post-partum day #1  Normal spontaneous vaginal delivery  L&D complications: None      Doing well.      Plan:   Anticipate discharge tomorrow           Interval History:   Doing well.  Pain is well-controlled.  No fevers.  No history of foul-smelling vaginal discharge.  Good appetite.  Denies chest pain, shortness of breath, nausea or vomiting.  Vaginal bleeding is similar to a heavy menstrual flow.  Ambulatory.  Breastfeeding well.          Significant Problems:    None          Review of Systems:    The patient denies any chest pain, shortness of breath, excessive pain, fever, chills, purulent drainage from the wound, nausea or vomiting.          Medications:   All medications related to the patient's surgery have been reviewed          Physical Exam:     All vitals stable  Patient Vitals for the past 12 hrs:   BP Temp Temp src Pulse Resp   11/04/18 0805 112/64 98.4  F (36.9  C) Oral 71 16     Uterine fundus is firm, non-tender and at the level of the umbilicus          Data:     All laboratory data related to this surgery reviewed  Hemoglobin   Date Value Ref Range Status   11/03/2018 10.6 (L) 11.7 - 15.7 g/dL Final   08/24/2018 10.9 (A) 11.7 - 15.7 g/dL Final   03/16/2018 13.7 11.7 - 15.7 g/dL Final   08/30/2015 10.5 (L) 11.7 - 15.7 g/dL Final   08/01/2014 12.8 11.7 - 15.7 g/dL Final     No imaging studies have been ordered    Saud Parsons MD, MD

## 2018-11-04 NOTE — LACTATION NOTE
This note was copied from a baby's chart.  Lactation to see patient.  Mom reports breastfeeding is going well with no concerns or questions.  Encouraged to call PRN.

## 2018-11-05 VITALS
TEMPERATURE: 97.9 F | SYSTOLIC BLOOD PRESSURE: 119 MMHG | RESPIRATION RATE: 18 BRPM | DIASTOLIC BLOOD PRESSURE: 77 MMHG | BODY MASS INDEX: 26.8 KG/M2 | HEART RATE: 64 BPM | WEIGHT: 157 LBS | HEIGHT: 64 IN

## 2018-11-05 PROCEDURE — 25000132 ZZH RX MED GY IP 250 OP 250 PS 637: Performed by: OBSTETRICS & GYNECOLOGY

## 2018-11-05 RX ORDER — ACETAMINOPHEN 325 MG/1
650 TABLET ORAL EVERY 4 HOURS PRN
Qty: 100 TABLET | COMMUNITY
Start: 2018-11-05

## 2018-11-05 RX ADMIN — SENNOSIDES AND DOCUSATE SODIUM 1 TABLET: 8.6; 5 TABLET ORAL at 08:51

## 2018-11-05 RX ADMIN — ACETAMINOPHEN 650 MG: 325 TABLET, FILM COATED ORAL at 08:51

## 2018-11-05 RX ADMIN — IBUPROFEN 800 MG: 800 TABLET ORAL at 03:31

## 2018-11-05 ASSESSMENT — ACTIVITIES OF DAILY LIVING (ADL)
TRANSFERRING: 0-->INDEPENDENT
RETIRED_COMMUNICATION: 0-->UNDERSTANDS/COMMUNICATES WITHOUT DIFFICULTY
COGNITION: 0 - NO COGNITION ISSUES REPORTED
AMBULATION: 0-->INDEPENDENT
TOILETING: 0-->INDEPENDENT
BATHING: 0-->INDEPENDENT
DRESS: 0-->INDEPENDENT
RETIRED_EATING: 0-->INDEPENDENT
SWALLOWING: 0-->SWALLOWS FOODS/LIQUIDS WITHOUT DIFFICULTY
FALL_HISTORY_WITHIN_LAST_SIX_MONTHS: NO

## 2018-11-05 NOTE — DISCHARGE SUMMARY
[facility] Obstetrics Post-Partum Progress Note          Assessment and Plan:    Assessment:   Post-partum day #2  Desirae Kevin  Is a 23 year old female who was admitted for induction of labor due to polyhydramnios and macrosomia.  L&D complications: None      Doing well.  Normal healing wound.  No immediate surgical complications identified.  No excessive bleeding  Pain well-controlled.      Plan:   Ambulation encouraged  Breast feeding strategies discussed  Monitor wound for signs of infection  Pain control measures as needed  Reportable signs and symptoms dicussed with the patient     Take your tempature twice daily for 3 days and call if > 100.4  Nothing in vagina for 6 wks  Continue taking prenatal MVI daily for 1 month             Interval History:   Doing well.  Pain is well-controlled.  No fevers.  No history of foul-smelling vaginal discharge.  Good appetite.  Denies chest pain, shortness of breath, nausea or vomiting.  Vaginal bleeding is similar to a heavy menstrual flow.  Ambulatory.  Breastfeeding well.          Significant Problems:      Past Medical History:   Diagnosis Date     Anemia 2015    iron supplements     Migraine              Review of Systems:    The patient denies any chest pain, shortness of breath, excessive pain, fever, chills, purulent drainage from the wound, nausea or vomiting.          Medications:     All medications related to the patient's surgery have been reviewed  Current Facility-Administered Medications   Medication     acetaminophen (TYLENOL) tablet 650 mg     bisacodyl (DULCOLAX) Suppository 10 mg     hydrocortisone 2.5 % cream     HYDROmorphone (PF) (DILAUDID) injection 0.3 mg     ibuprofen (ADVIL/MOTRIN) tablet 800 mg     lactated ringers BOLUS 1,000 mL     lanolin ointment     naloxone (NARCAN) injection 0.1-0.4 mg     No MMR Needed - Assessment: Patient does not need MMR vaccine     NO Rho (D) immune globulin (RhoGam) needed - mother Rh POSITIVE     No  Tdap Needed - Assessment: Patient does not need Tdap vaccine     oxytocin (PITOCIN) 30 units in 500 mL 0.9% NaCl infusion     oxytocin (PITOCIN) 30 units in 500 mL 0.9% NaCl infusion     oxytocin (PITOCIN) injection 10 Units     senna-docusate (SENOKOT-S;PERICOLACE) 8.6-50 MG per tablet 1 tablet    Or     senna-docusate (SENOKOT-S;PERICOLACE) 8.6-50 MG per tablet 2 tablet     sodium phosphate (FLEET ENEMA) 1 enema     tranexamic acid (CYKLOKAPRON) infusion 1 g             Physical Exam:   Vitals were reviewed  All vitals stable  Temp: 97.9  F (36.6  C) Temp src: Oral BP: 105/61 Pulse: 64   Resp: 18        Uterine fundus is firm, non-tender and at the level of the umbilicus          Data:     All laboratory data related to this surgery reviewed  Hemoglobin   Date Value Ref Range Status   11/03/2018 10.6 (L) 11.7 - 15.7 g/dL Final   08/24/2018 10.9 (A) 11.7 - 15.7 g/dL Final   03/16/2018 13.7 11.7 - 15.7 g/dL Final   08/30/2015 10.5 (L) 11.7 - 15.7 g/dL Final   08/01/2014 12.8 11.7 - 15.7 g/dL Final     No imaging studies have been ordered    Nigel Bustos MD

## 2018-11-05 NOTE — DISCHARGE INSTRUCTIONS
LACTATION: [phone]    Please follow-up with your MD in 6 weeks.     Take your tempature twice daily for 3 days and call if > 100.4  Nothing in vagina for 6 wks  Continue taking prenatal MVI daily for 1 month    Postpartum Vaginal Delivery Instructions    Activity       Ask family and friends for help when you need it.    Do not place anything in your vagina for 6 weeks.    You are not restricted on other activities, but take it easy for a few weeks to allow your body to recover from delivery.  You are able to do any activities you feel up to that point.    No driving until you have stopped taking your pain medications (usually two weeks after delivery).     Call your health care provider if you have any of these symptoms:       Increased pain, swelling, redness, or fluid around your stiches from an episiotomy or perineal tear.    A fever above 100.4 F (38 C) with or without chills when placing a thermometer under your tongue.    You soak a sanitary pad with blood within 1 hour, or you see blood clots larger than a golf ball.    Bleeding that lasts more than 6 weeks.    Vaginal discharge that smells bad.    Severe pain, cramping or tenderness in your lower belly area.    A need to urinate more frequently (use the toilet more often), more urgently (use the toilet very quickly), or it burns when you urinate.    Nausea and vomiting.    Redness, swelling or pain around a vein in your leg.    Problems breastfeeding or a red or painful area on your breast.    Chest pain and cough or are gasping for air.    Problems coping with sadness, anxiety, or depression.  If you have any concerns about hurting yourself or the baby, call your provider immediately.     You have questions or concerns after you return home.     Keep your hands clean:  Always wash your hands before touching your perineal area and stitches.  This helps reduce your risk of infection.  If your hands aren't dirty, you may use an alcohol hand-rub to clean  your hands. Keep your nails clean and short.

## 2018-11-05 NOTE — PLAN OF CARE
Problem: Patient Care Overview  Goal: Plan of Care/Patient Progress Review  Outcome: Improving  Stable patient, pain well managed. Pt is independent with cares and breastfeeding. Bonding well with infant.

## 2018-11-05 NOTE — PLAN OF CARE
Problem: Patient Care Overview  Goal: Plan of Care/Patient Progress Review  Outcome: Improving  Patient vitally stable, voiding without issue, tolerating regular diet, ambulating independently. Postpartum checks WDL. Pain adequately managed with prn Ibuprofen and Tylenol. Breastfeeding independently.  present and supportive. Attentive to infant.

## 2018-11-05 NOTE — PLAN OF CARE
Problem: Patient Care Overview  Goal: Plan of Care/Patient Progress Review  Outcome: Adequate for Discharge Date Met: 11/05/18  Pt up and eric. Voiding without difficulty. Breastfeeding without assistance. Tylenol and ibuprofen effective for pain management. Bonding well with baby, responding to infant cues. Fundus firm and midline. Breast pump given. Education completed. Discharge instructions explained and all questions and concerns answered. Adequate for discharge. Discharged at 1120.     Therese Coates

## 2018-11-05 NOTE — PROGRESS NOTES
[facility] Obstetrics Post-Partum Progress Note          Assessment and Plan:    Assessment:   Post-partum day #2  Desirae Kevin  Is a 23 year old female who was admitted for induction of labor due to polyhydramnios and macrosomia.  L&D complications: None      Doing well.  Normal healing wound.  No immediate surgical complications identified.  No excessive bleeding  Pain well-controlled.      Plan:   Ambulation encouraged  Breast feeding strategies discussed  Monitor wound for signs of infection  Pain control measures as needed  Reportable signs and symptoms dicussed with the patient     Take your tempature twice daily for 3 days and call if > 100.4  Nothing in vagina for 6 wks  Continue taking prenatal MVI daily for 1 month             Interval History:   Doing well.  Pain is well-controlled.  No fevers.  No history of foul-smelling vaginal discharge.  Good appetite.  Denies chest pain, shortness of breath, nausea or vomiting.  Vaginal bleeding is similar to a heavy menstrual flow.  Ambulatory.  Breastfeeding well.          Significant Problems:      Past Medical History:   Diagnosis Date     Anemia 2015    iron supplements     Migraine              Review of Systems:    The patient denies any chest pain, shortness of breath, excessive pain, fever, chills, purulent drainage from the wound, nausea or vomiting.          Medications:     All medications related to the patient's surgery have been reviewed  Current Facility-Administered Medications   Medication     acetaminophen (TYLENOL) tablet 650 mg     bisacodyl (DULCOLAX) Suppository 10 mg     hydrocortisone 2.5 % cream     HYDROmorphone (PF) (DILAUDID) injection 0.3 mg     ibuprofen (ADVIL/MOTRIN) tablet 800 mg     lactated ringers BOLUS 1,000 mL     lanolin ointment     naloxone (NARCAN) injection 0.1-0.4 mg     No MMR Needed - Assessment: Patient does not need MMR vaccine     NO Rho (D) immune globulin (RhoGam) needed - mother Rh POSITIVE     No  Tdap Needed - Assessment: Patient does not need Tdap vaccine     oxytocin (PITOCIN) 30 units in 500 mL 0.9% NaCl infusion     oxytocin (PITOCIN) 30 units in 500 mL 0.9% NaCl infusion     oxytocin (PITOCIN) injection 10 Units     senna-docusate (SENOKOT-S;PERICOLACE) 8.6-50 MG per tablet 1 tablet    Or     senna-docusate (SENOKOT-S;PERICOLACE) 8.6-50 MG per tablet 2 tablet     sodium phosphate (FLEET ENEMA) 1 enema     tranexamic acid (CYKLOKAPRON) infusion 1 g             Physical Exam:   Vitals were reviewed  All vitals stable  Temp: 97.9  F (36.6  C) Temp src: Oral BP: 105/61 Pulse: 64   Resp: 18        Uterine fundus is firm, non-tender and at the level of the umbilicus          Data:     All laboratory data related to this surgery reviewed  Hemoglobin   Date Value Ref Range Status   11/03/2018 10.6 (L) 11.7 - 15.7 g/dL Final   08/24/2018 10.9 (A) 11.7 - 15.7 g/dL Final   03/16/2018 13.7 11.7 - 15.7 g/dL Final   08/30/2015 10.5 (L) 11.7 - 15.7 g/dL Final   08/01/2014 12.8 11.7 - 15.7 g/dL Final     No imaging studies have been ordered    Nigel Bustos MD

## 2019-01-22 ENCOUNTER — TRANSFERRED RECORDS (OUTPATIENT)
Dept: HEALTH INFORMATION MANAGEMENT | Facility: CLINIC | Age: 24
End: 2019-01-22

## 2019-02-20 ENCOUNTER — TRANSFERRED RECORDS (OUTPATIENT)
Dept: HEALTH INFORMATION MANAGEMENT | Facility: CLINIC | Age: 24
End: 2019-02-20

## 2019-02-26 ENCOUNTER — TELEPHONE (OUTPATIENT)
Dept: INTERNAL MEDICINE | Facility: CLINIC | Age: 24
End: 2019-02-26

## 2019-02-26 ENCOUNTER — OFFICE VISIT (OUTPATIENT)
Dept: INTERNAL MEDICINE | Facility: CLINIC | Age: 24
End: 2019-02-26
Payer: COMMERCIAL

## 2019-02-26 ENCOUNTER — ANCILLARY PROCEDURE (OUTPATIENT)
Dept: GENERAL RADIOLOGY | Facility: CLINIC | Age: 24
End: 2019-02-26
Attending: NURSE PRACTITIONER
Payer: COMMERCIAL

## 2019-02-26 VITALS
TEMPERATURE: 97.7 F | RESPIRATION RATE: 16 BRPM | BODY MASS INDEX: 21.22 KG/M2 | HEIGHT: 64 IN | WEIGHT: 124.3 LBS | DIASTOLIC BLOOD PRESSURE: 66 MMHG | HEART RATE: 97 BPM | SYSTOLIC BLOOD PRESSURE: 110 MMHG | OXYGEN SATURATION: 100 %

## 2019-02-26 DIAGNOSIS — F90.9 ATTENTION DEFICIT HYPERACTIVITY DISORDER (ADHD), UNSPECIFIED ADHD TYPE: Primary | ICD-10-CM

## 2019-02-26 DIAGNOSIS — M25.552 BILATERAL HIP PAIN: ICD-10-CM

## 2019-02-26 DIAGNOSIS — M25.551 BILATERAL HIP PAIN: ICD-10-CM

## 2019-02-26 PROBLEM — O09.899 HISTORY OF PRETERM DELIVERY, CURRENTLY PREGNANT: Status: RESOLVED | Noted: 2018-03-16 | Resolved: 2019-02-26

## 2019-02-26 PROBLEM — Z87.59 HISTORY OF POLYHYDRAMNIOS: Status: RESOLVED | Noted: 2018-03-16 | Resolved: 2019-02-26

## 2019-02-26 PROCEDURE — 99203 OFFICE O/P NEW LOW 30 MIN: CPT | Performed by: NURSE PRACTITIONER

## 2019-02-26 PROCEDURE — 72170 X-RAY EXAM OF PELVIS: CPT

## 2019-02-26 RX ORDER — DEXTROAMPHETAMINE SACCHARATE, AMPHETAMINE ASPARTATE MONOHYDRATE, DEXTROAMPHETAMINE SULFATE AND AMPHETAMINE SULFATE 2.5; 2.5; 2.5; 2.5 MG/1; MG/1; MG/1; MG/1
10 CAPSULE, EXTENDED RELEASE ORAL DAILY
Qty: 30 CAPSULE | Refills: 0 | Status: SHIPPED | OUTPATIENT
Start: 2019-02-26

## 2019-02-26 ASSESSMENT — ANXIETY QUESTIONNAIRES
3. WORRYING TOO MUCH ABOUT DIFFERENT THINGS: MORE THAN HALF THE DAYS
5. BEING SO RESTLESS THAT IT IS HARD TO SIT STILL: SEVERAL DAYS
GAD7 TOTAL SCORE: 12
7. FEELING AFRAID AS IF SOMETHING AWFUL MIGHT HAPPEN: MORE THAN HALF THE DAYS
1. FEELING NERVOUS, ANXIOUS, OR ON EDGE: MORE THAN HALF THE DAYS
2. NOT BEING ABLE TO STOP OR CONTROL WORRYING: MORE THAN HALF THE DAYS
6. BECOMING EASILY ANNOYED OR IRRITABLE: SEVERAL DAYS
IF YOU CHECKED OFF ANY PROBLEMS ON THIS QUESTIONNAIRE, HOW DIFFICULT HAVE THESE PROBLEMS MADE IT FOR YOU TO DO YOUR WORK, TAKE CARE OF THINGS AT HOME, OR GET ALONG WITH OTHER PEOPLE: SOMEWHAT DIFFICULT

## 2019-02-26 ASSESSMENT — PATIENT HEALTH QUESTIONNAIRE - PHQ9
SUM OF ALL RESPONSES TO PHQ QUESTIONS 1-9: 6
5. POOR APPETITE OR OVEREATING: MORE THAN HALF THE DAYS

## 2019-02-26 ASSESSMENT — MIFFLIN-ST. JEOR: SCORE: 1289.85

## 2019-02-26 NOTE — NURSING NOTE
Rx for adderall put in [facility] pharmacy folder for .  KENYON DAUGHERTY,HOLLY

## 2019-02-26 NOTE — LETTER
[facility]  02/26/19    Patient: Desirae Kevin  YOB: 1995  Medical Record Number: [id number]  CSN: [id number]                                                                              Non-opioid Controlled Substance Agreement    I understand that my care provider has prescribed a controlled substance to help manage my condition(s). I am taking this medicine to help me function or work. I know this is strong medicine, and that it can cause serious side effects. Controlled substances can be sedating, addicting and may cause a dependency on the drug. They can affect my ability to drive or think, and cause depression. They need to be taken exactly as prescribed. Combining controlled substances with certain medicines or chemicals (such as cocaine, sedatives and tranquilizers, sleeping pills, meth) can be dangerous or even fatal. Also, if I stop controlled substances suddenly, I may have severe withdrawal symptoms.  If not helpful, I may be asked to stop them.    The risks, benefits, and side effects of these medicine(s) were explained to me. I agree that:    1. I will take part in other treatments as advised by my care team. This may be psychiatry or counseling, physical therapy, behavioral therapy, group treatment or a referral to a pain clinic. I will reduce or stop my medicine when my care team tells me to do so.  2. I will take my medicines as prescribed. I will not change the dose or schedule unless my care team tells me to. There will be no refills if I  run out early.   I may be contactedwithout warning and asked to complete a urine drug test or pill count at any time.   3. I will keep all my appointments, and understand this is part of the monitoring of controlled substances. My care team may require an office visit for EVERY controlled substance refill. If I miss appointments or don t follow instructions, my care team may stop my medicine.  4. I will not ask other  BS scanned for your review    toujeo 20u qhs  humalog 5 units with meals providers to prescribe controlled substances, and I will not accept controlled substances from other people. If I need another prescribed controlled substance for a new reason, I will tell my care team within 1 business day.  5. I will use one pharmacy to fill all of my controlled substance prescriptions, and it is up to me to make sure that I do not run out of my medicines on weekends or holidays. If my care team is willing to refill my controlled substance prescription without a visit, I must request refills only during office hours, refills may take up to 3 days to process, and it may take up to 5 to 7 days for my medicine to be mailed and ready at my pharmacy. Prescriptions will not be mailed anywhere except my pharmacy.    6. I am responsible for my prescriptions. If the medicine/prescription is lost or stolen, it will not be replaced. I also agree not to share controlled substance medicines with anyone.              [facility]  02/26/19  Patient:  Desirae Kevin  YOB: 1995  Medical Record Number: [id number]  CSN: [id number]    7. I agree to not use ANY illegal or recreational drugs. This includes marijuana, cocaine, bath salts or other drugs. I agree not to use alcohol unless my care team says I may. I agree to give urine samples whenever asked. If I don t give a urine sample, the care team may stop my medicine.    8. If I enroll in the [place] Medical Marijuana program, I will tell my care team. I will also sign an agreement to share my medical records with my care team.    9. I will bring in my list of medicines (or my medicine bottles) each time I come to the clinic.   10. I will tell my care team right away if I become pregnant or have a new medical problem treated outside of my regular clinic.  11. I understand that this medicine can affect my thinking and judgment. It may be unsafe for me to drive, use machinery and do dangerous tasks. I will not do any of these  things until I know how the medicine affects me. If my dose changes, I will wait to see how it affects me. I will contact my care team if I have concerns about medicine side effects.    I understand that if I do not follow any of the conditions above, my prescriptions or treatment may be stopped.      I agree that my provider, clinic care team, and pharmacy may work with any city, state or federal law enforcement agency that investigates the misuse, sale, or other diversion of my controlled medicine. I will allow my provider to discuss my care with or share a copy of this agreement with any other treating provider, pharmacy or emergency room where I receive care. I agree to give up (waive) any right of privacy or confidentiality with respect to these consents.   I have read this agreement and have asked questions about anything I did not understand.    ____________________________________________________    ____________  ________  Patient signature                                                         Date      Time    ____________________________________________________     ____________  ________  Witness                                                          Date      Time    ____________________________________________________  Provider signature

## 2019-02-26 NOTE — TELEPHONE ENCOUNTER
Please advise pt ADHD, anxiety records scanned into EMR.  Rx adderal 10 mg daily sent to  [facility].  CSA printed, please stop by to review with RN and sign.  Marlyn Pnaiagua CNP

## 2019-02-26 NOTE — PROGRESS NOTES
"  SUBJECTIVE:   Desirae Kevin is a 23 year old female who presents to clinic today for the following health issues:      H/o ADHD, last evaluation 2 years ago, can bring copy of eval in to scan  Did well on adderall 10 mg daily, has been offduring most recent pregnancy  Trouble with focus, task completion, concentration.  Previous mental health prescriber not available x 3 months    Musculoskeletal problem/pain      Duration: months    Description  Location: bilateral hip \"clicking\" when standing, walking long periods of time    Intensity:  mild    Accompanying signs and symptoms: none    History  Previous similar problem: no   Previous evaluation:  none    Precipitating or alleviating factors:  Trauma or overuse: no   Aggravating factors include: standing and walking    Therapies tried and outcome: nothing      Problem list and histories reviewed & adjusted, as indicated.  Additional history: as documented    Patient Active Problem List   Diagnosis     ADHD (attention deficit hyperactivity disorder)     Past Surgical History:   Procedure Laterality Date     NO HISTORY OF SURGERY  09/2018       Social History     Tobacco Use     Smoking status: Never Smoker     Smokeless tobacco: Never Used   Substance Use Topics     Alcohol use: No     Family History   Problem Relation Age of Onset     No Known Problems Mother      No Known Problems Father          Current Outpatient Medications   Medication Sig Dispense Refill     acetaminophen (TYLENOL) 325 MG tablet Take 2 tablets (650 mg) by mouth every 4 hours as needed for mild pain or fever (greater than or equal to 38  C /100.4  F (oral) or 38.5  C/ 101.4  F (core).) 100 tablet      norgestrel-ethinyl estradiol (LO/OVRAL) 0.3-30 MG-MCG tablet Take 1 tablet by mouth daily       BP Readings from Last 3 Encounters:   02/26/19 110/66   11/05/18 119/77   10/31/18 116/60    Wt Readings from Last 3 Encounters:   02/26/19 56.4 kg (124 lb 4.8 oz)   11/03/18 71.2 kg (157 lb) " "  10/31/18 71.6 kg (157 lb 12.8 oz)                    Reviewed and updated as needed this visit by clinical staff  Tobacco  Allergies  Meds  Med Hx  Surg Hx  Fam Hx  Soc Hx      Reviewed and updated as needed this visit by Provider         ROS:  Constitutional, HEENT, cardiovascular, pulmonary, gi and gu systems are negative, except as otherwise noted.    OBJECTIVE:     /66 (BP Location: Right arm, Patient Position: Sitting, Cuff Size: Adult Regular)   Pulse 97   Temp 97.7  F (36.5  C) (Oral)   Resp 16   Ht 1.619 m (5' 3.75\")   Wt 56.4 kg (124 lb 4.8 oz)   LMP 02/18/2019 (Exact Date)   SpO2 100%   Breastfeeding? No   BMI 21.50 kg/m    Body mass index is 21.5 kg/m .  GENERAL: healthy, alert and no distress  PSYCH: mentation appears normal, affect normal/bright        ASSESSMENT/PLAN:               ICD-10-CM    1. Attention deficit hyperactivity disorder (ADHD), unspecified ADHD type F90.9    2. Bilateral hip pain M25.551 XR Pelvis 1/2 Views    M25.552        Pending ADHD prior evaluation review, will need CSA, terms explained to pt.  Pending xrays may benefit from PT  Pt agrees.    Marlyn Paniagua NP  [facility]    "

## 2019-02-27 ENCOUNTER — TELEPHONE (OUTPATIENT)
Dept: INTERNAL MEDICINE | Facility: CLINIC | Age: 24
End: 2019-02-27

## 2019-02-27 DIAGNOSIS — M25.551 BILATERAL HIP PAIN: Primary | ICD-10-CM

## 2019-02-27 DIAGNOSIS — M25.552 BILATERAL HIP PAIN: Primary | ICD-10-CM

## 2019-02-27 ASSESSMENT — ANXIETY QUESTIONNAIRES: GAD7 TOTAL SCORE: 12

## 2019-02-27 NOTE — LETTER
March 5, 2019      Desirae Kevin  [address]  [facility] [facility] [address]        Dear ,    The results of your recent hip xray was normal and a referral to Physical therapy was sent.    If you have any questions or concerns, please call the clinic at the number listed above.       Sincerely,        Marlyn Paniagua NP

## 2019-03-01 ENCOUNTER — THERAPY VISIT (OUTPATIENT)
Dept: PHYSICAL THERAPY | Facility: CLINIC | Age: 24
End: 2019-03-01
Payer: COMMERCIAL

## 2019-03-01 DIAGNOSIS — M25.552 BILATERAL HIP PAIN: ICD-10-CM

## 2019-03-01 DIAGNOSIS — M25.551 BILATERAL HIP PAIN: ICD-10-CM

## 2019-03-01 PROCEDURE — 97110 THERAPEUTIC EXERCISES: CPT | Mod: GP | Performed by: PHYSICAL THERAPIST

## 2019-03-01 PROCEDURE — 97162 PT EVAL MOD COMPLEX 30 MIN: CPT | Mod: GP | Performed by: PHYSICAL THERAPIST

## 2019-03-01 ASSESSMENT — ACTIVITIES OF DAILY LIVING (ADL)
WALKING_APPROXIMATELY_10_MINUTES: SLIGHT DIFFICULTY
GETTING_INTO_AND_OUT_OF_AN_AVERAGE_CAR: SLIGHT DIFFICULTY
WALKING_UP_STEEP_HILLS: SLIGHT DIFFICULTY
HOS_ADL_COUNT: 17
WALKING_15_MINUTES_OR_GREATER: MODERATE DIFFICULTY
TWISTING/PIVOTING_ON_INVOLVED_LEG: SLIGHT DIFFICULTY
PUTTING_ON_SOCKS_AND_SHOES: NO DIFFICULTY AT ALL
GETTING_INTO_AND_OUT_OF_A_BATHTUB: NO DIFFICULTY AT ALL
GOING_UP_1_FLIGHT_OF_STAIRS: SLIGHT DIFFICULTY
HOS_ADL_SCORE(%): 77.94
HOW_WOULD_YOU_RATE_YOUR_CURRENT_LEVEL_OF_FUNCTION_DURING_YOUR_USUAL_ACTIVITIES_OF_DAILY_LIVING_FROM_0_TO_100_WITH_100_BEING_YOUR_LEVEL_OF_FUNCTION_PRIOR_TO_YOUR_HIP_PROBLEM_AND_0_BEING_THE_INABILITY_TO_PERFORM_ANY_OF_YOUR_USUAL_DAILY_ACTIVITIES?: 75
HOS_ADL_HIGHEST_POTENTIAL_SCORE: 68
SITTING_FOR_15_MINUTES: NO DIFFICULTY AT ALL
STEPPING_UP_AND_DOWN_CURBS: NO DIFFICULTY AT ALL
RECREATIONAL_ACTIVITIES: SLIGHT DIFFICULTY
HOS_ADL_ITEM_SCORE_TOTAL: 53
HEAVY_WORK: SLIGHT DIFFICULTY
LIGHT_TO_MODERATE_WORK: SLIGHT DIFFICULTY
WALKING_INITIALLY: NO DIFFICULTY AT ALL
DEEP_SQUATTING: MODERATE DIFFICULTY
ROLLING_OVER_IN_BED: NO DIFFICULTY AT ALL
GOING_DOWN_1_FLIGHT_OF_STAIRS: SLIGHT DIFFICULTY
WALKING_DOWN_STEEP_HILLS: SLIGHT DIFFICULTY
STANDING_FOR_15_MINUTES: SLIGHT DIFFICULTY

## 2019-03-01 NOTE — PROGRESS NOTES
[facility]: Physical Therapy Initial Evaluation   Mar 1, 2019    Subjective:   Chief Complaint: bilateral hip popping and pain   Pain: bilateral hip pain that radiates down into her leg to the mid-calf, and into the lower back   Numbness/Tingling: in the area around the hip, notices it more on the right   Weakness: Doesn't have issues with weakness   Stiffness: affected with bending down  New/Recurrent/Chronic: Chronic  DOI/onset: started when she was 10 years old   Referral Date: 2/27/2019 - Marlyn Paniagua NP  Mechanism of onset: no specific mechanism ; gotten progressively worse with each (of four) pregnancies.   PMH/surgical history/trauma:    - Depression / ADHD  General health as reported by patient: Good    Medications: birth control, adderall   Occupation:  Job duties: prolonged standing, repetitive tasks  Previous Treatment (Effect): None  Imaging: X-Ray: IMPRESSION: No acute bony abnormality related to the hip regions or adjacent included pelvic bones and proximal femurs. There is a focus of increased bony density in the intertrochanteric region of the proximal right femur measuring 1.8 x 1.3 cm. This corresponds to a well-defined ring shaped focus of sclerosis seen on the prior CT exam. The overall size and morphology of the lesion is unchanged and it is considered benign.  AM/PM: gets worse as the day goes on  Quality of Pain: sore, aching, weird sensation  Pain: 2/10 at present, 0/10 at best, 8/10 at worst  Worse: standing/walking (the longer, the worse it is), restrictive clothing at the hips, sitting with internal rotation (demonstrated)  Better: looser clothing, laying on a padded surface (prefers back/stomach, left side more than right),   Progression of Symptoms since onset: progressively worse   Sleeping: no significantly disruptive, based more on activity during the day   Current Functional Status: standing - 9 hours with pain up to 8.10 at end of day ;  walking - walked around the [place] [employer] 4-5 hours with popping and increasing pain in the hips, low back, and thighs.    Current HEP/exercise regimen: None  Live with Others: , 4 kids (9, 5, and 3 years old, and 4 months)  Red Flags:   - Patient denies the following: Pain with Cough / Sneeze / Laughing ; Fever ; Weakness ; Saddle Anesthesia ; Change in Bowel or Bladder ;  - Patient reports the following: Numbness/Tingling ;     Patient's Goal(s): Relieve the pain, make the popping stop/get better.     Objective:    Posture: anterior pelvic tilt, increased lumbar lordosis    Gait: right hip drop, increased pronation in the left ankle    Lumbar Screen:   Excessive flexion (able to palm floor)  moderate extension with midline pain at end range  Left sideglide - nil limitation  Rigth sideglide - moderate limitation with symptoms in the left hip    SL Balance: WNL    HIP: (* indicates patient's pain)   MMT R MMT L   Flexion 5 5   abduction 4 4+   Extension 5- 5-     Other:  - Patient is able to sublux her right femoroacetabular joint in standing.       Assessment/Plan:    Patient is a 23 year old female with both sides hip complaints.    Patient has the following significant findings with corresponding treatment plan.                Referring Diagnosis: Bilateral hip pain  PT Diagnosis: Bilateral hip pain with hypermobility/instability  Pain -  hot/cold therapy, manual therapy, splint/taping/bracing/orthotics, self management, education and home program  Decreased strength - therapeutic exercise, therapeutic activities and home program  Impaired gait - gait training and home program  Impaired muscle performance - neuro re-education and home program  Decreased function - therapeutic activities and home program  Impaired posture - neuro re-education, therapeutic activities and home program  Instability -  Therapeutic Activity, Therapeutic Exercise, Neuromuscular Re-education, Splinting/Taping/Bracing/Orthotic, and  home program      Therapy Evaluation Codes:   1) History comprised of:   Personal factors that impact the plan of care:      Living environment and Profession.    Comorbidity factors that impact the plan of care are:      Mental illness and Multiple Pregnancies.     Medications impacting care: None.  2) Examination of Body Systems comprised of:   Body structures and functions that impact the plan of care:      Ankle, Hip and Lumbar spine.   Activity limitations that impact the plan of care are:      Standing and Walking.  3) Clinical presentation characteristics are:   Unstable/Unpredictable.  4) Decision-Making    Moderate complexity using standardized patient assessment instrument and/or measureable assessment of functional outcome.  Cumulative Therapy Evaluation is: Moderate complexity.    Previous and current functional limitations:  (See Goal Flow Sheet for this information)    Short term and Long term goals: (See Goal Flow Sheet for this information)     Communication ability:  Patient appears to be able to clearly communicate and understand verbal and written communication and follow directions correctly.  Treatment Explanation - The following has been discussed with the patient:   RX ordered/plan of care  Anticipated outcomes  Possible risks and side effects  This patient would benefit from PT intervention to resume normal activities.   Rehab potential is good.    Frequency:  1 X week, once daily  Duration:  for 4 weeks tapering to 2 X a month over 8 weeks  Discharge Plan:  Achieve all LTG.  Independent in home treatment program.  Reach maximal therapeutic benefit.    Please refer to the daily flowsheet for treatment today, total treatment time and time spent performing 1:1 timed codes.

## 2019-03-08 ENCOUNTER — THERAPY VISIT (OUTPATIENT)
Dept: PHYSICAL THERAPY | Facility: CLINIC | Age: 24
End: 2019-03-08
Payer: COMMERCIAL

## 2019-03-08 DIAGNOSIS — M25.552 BILATERAL HIP PAIN: ICD-10-CM

## 2019-03-08 DIAGNOSIS — M25.551 BILATERAL HIP PAIN: ICD-10-CM

## 2019-03-08 PROCEDURE — 97112 NEUROMUSCULAR REEDUCATION: CPT | Mod: GP | Performed by: PHYSICAL THERAPIST

## 2019-03-08 PROCEDURE — 97110 THERAPEUTIC EXERCISES: CPT | Mod: GP | Performed by: PHYSICAL THERAPIST

## 2019-03-20 ENCOUNTER — THERAPY VISIT (OUTPATIENT)
Dept: PHYSICAL THERAPY | Facility: CLINIC | Age: 24
End: 2019-03-20
Payer: COMMERCIAL

## 2019-03-20 DIAGNOSIS — M25.551 BILATERAL HIP PAIN: ICD-10-CM

## 2019-03-20 DIAGNOSIS — M25.552 BILATERAL HIP PAIN: ICD-10-CM

## 2019-03-20 PROCEDURE — 97110 THERAPEUTIC EXERCISES: CPT | Mod: GP | Performed by: PHYSICAL THERAPIST

## 2019-03-20 PROCEDURE — 97112 NEUROMUSCULAR REEDUCATION: CPT | Mod: GP | Performed by: PHYSICAL THERAPIST

## 2019-04-19 ENCOUNTER — THERAPY VISIT (OUTPATIENT)
Dept: PHYSICAL THERAPY | Facility: CLINIC | Age: 24
End: 2019-04-19
Payer: COMMERCIAL

## 2019-04-19 DIAGNOSIS — M25.551 BILATERAL HIP PAIN: ICD-10-CM

## 2019-04-19 DIAGNOSIS — M25.552 BILATERAL HIP PAIN: ICD-10-CM

## 2019-04-19 PROCEDURE — 97110 THERAPEUTIC EXERCISES: CPT | Mod: GP | Performed by: PHYSICAL THERAPIST

## 2019-04-19 PROCEDURE — 97112 NEUROMUSCULAR REEDUCATION: CPT | Mod: GP | Performed by: PHYSICAL THERAPIST

## 2019-04-29 ENCOUNTER — TELEPHONE (OUTPATIENT)
Dept: INTERNAL MEDICINE | Facility: CLINIC | Age: 24
End: 2019-04-29

## 2019-04-29 NOTE — TELEPHONE ENCOUNTER
Panel Management Review      Patient has the following on her problem list: None      Composite cancer screening  Chart review shows that this patient is due/due soon for the following Pap Smear  Summary:    Patient is due/failing the following:   PAP and PHYSICAL    Action needed:   Patient needs office visit for as above.    Type of outreach:    None. Last pap per [facility]- wnl at .    Questions for provider review:    None                                                                                                                                    KENYON Zarate LPN       Chart routed to none.

## 2019-05-24 ENCOUNTER — HOSPITAL ENCOUNTER (EMERGENCY)
Facility: CLINIC | Age: 24
Discharge: HOME | End: 2019-05-25
Attending: EMERGENCY MEDICINE | Admitting: EMERGENCY MEDICINE
Payer: COMMERCIAL

## 2019-05-24 DIAGNOSIS — E28.2 PCO (POLYCYSTIC OVARIES): ICD-10-CM

## 2019-05-24 DIAGNOSIS — R10.9 ACUTE ABDOMINAL PAIN: ICD-10-CM

## 2019-05-24 PROCEDURE — 85025 COMPLETE CBC W/AUTO DIFF WBC: CPT | Performed by: EMERGENCY MEDICINE

## 2019-05-24 PROCEDURE — 83690 ASSAY OF LIPASE: CPT | Performed by: EMERGENCY MEDICINE

## 2019-05-24 PROCEDURE — 99285 EMERGENCY DEPT VISIT HI MDM: CPT | Mod: 25

## 2019-05-24 PROCEDURE — 80076 HEPATIC FUNCTION PANEL: CPT | Performed by: EMERGENCY MEDICINE

## 2019-05-24 PROCEDURE — 80048 BASIC METABOLIC PNL TOTAL CA: CPT | Performed by: EMERGENCY MEDICINE

## 2019-05-25 ENCOUNTER — APPOINTMENT (OUTPATIENT)
Dept: ULTRASOUND IMAGING | Facility: CLINIC | Age: 24
End: 2019-05-25
Attending: EMERGENCY MEDICINE
Payer: COMMERCIAL

## 2019-05-25 VITALS
DIASTOLIC BLOOD PRESSURE: 55 MMHG | SYSTOLIC BLOOD PRESSURE: 98 MMHG | RESPIRATION RATE: 18 BRPM | TEMPERATURE: 97.9 F | OXYGEN SATURATION: 100 % | HEART RATE: 68 BPM

## 2019-05-25 LAB
ALBUMIN SERPL-MCNC: 3.7 G/DL (ref 3.4–5)
ALBUMIN UR-MCNC: NEGATIVE MG/DL
ALP SERPL-CCNC: 56 U/L (ref 40–150)
ALT SERPL W P-5'-P-CCNC: 19 U/L (ref 0–50)
ANION GAP SERPL CALCULATED.3IONS-SCNC: 4 MMOL/L (ref 3–14)
APPEARANCE UR: CLEAR
AST SERPL W P-5'-P-CCNC: 16 U/L (ref 0–45)
BASOPHILS # BLD AUTO: 0 10E9/L (ref 0–0.2)
BASOPHILS NFR BLD AUTO: 0.5 %
BILIRUB DIRECT SERPL-MCNC: <0.1 MG/DL (ref 0–0.2)
BILIRUB SERPL-MCNC: 0.4 MG/DL (ref 0.2–1.3)
BILIRUB UR QL STRIP: NEGATIVE
BUN SERPL-MCNC: 14 MG/DL (ref 7–30)
CALCIUM SERPL-MCNC: 8 MG/DL (ref 8.5–10.1)
CHLORIDE SERPL-SCNC: 109 MMOL/L (ref 94–109)
CO2 SERPL-SCNC: 27 MMOL/L (ref 20–32)
COLOR UR AUTO: ABNORMAL
CREAT SERPL-MCNC: 0.56 MG/DL (ref 0.52–1.04)
DIFFERENTIAL METHOD BLD: NORMAL
EOSINOPHIL # BLD AUTO: 0.1 10E9/L (ref 0–0.7)
EOSINOPHIL NFR BLD AUTO: 2.3 %
ERYTHROCYTE [DISTWIDTH] IN BLOOD BY AUTOMATED COUNT: 11.9 % (ref 10–15)
GFR SERPL CREATININE-BSD FRML MDRD: >90 ML/MIN/{1.73_M2}
GLUCOSE SERPL-MCNC: 90 MG/DL (ref 70–99)
GLUCOSE UR STRIP-MCNC: NEGATIVE MG/DL
HCG UR QL: NEGATIVE
HCT VFR BLD AUTO: 35.2 % (ref 35–47)
HGB BLD-MCNC: 11.7 G/DL (ref 11.7–15.7)
HGB UR QL STRIP: NEGATIVE
IMM GRANULOCYTES # BLD: 0 10E9/L (ref 0–0.4)
IMM GRANULOCYTES NFR BLD: 0.2 %
KETONES UR STRIP-MCNC: NEGATIVE MG/DL
LEUKOCYTE ESTERASE UR QL STRIP: NEGATIVE
LIPASE SERPL-CCNC: 192 U/L (ref 73–393)
LYMPHOCYTES # BLD AUTO: 2.1 10E9/L (ref 0.8–5.3)
LYMPHOCYTES NFR BLD AUTO: 48.7 %
MCH RBC QN AUTO: 29.3 PG (ref 26.5–33)
MCHC RBC AUTO-ENTMCNC: 33.2 G/DL (ref 31.5–36.5)
MCV RBC AUTO: 88 FL (ref 78–100)
MONOCYTES # BLD AUTO: 0.3 10E9/L (ref 0–1.3)
MONOCYTES NFR BLD AUTO: 7.8 %
MUCOUS THREADS #/AREA URNS LPF: PRESENT /LPF
NEUTROPHILS # BLD AUTO: 1.8 10E9/L (ref 1.6–8.3)
NEUTROPHILS NFR BLD AUTO: 40.5 %
NITRATE UR QL: NEGATIVE
NRBC # BLD AUTO: 0 10*3/UL
NRBC BLD AUTO-RTO: 0 /100
PH UR STRIP: 8 PH (ref 5–7)
PLATELET # BLD AUTO: 201 10E9/L (ref 150–450)
POTASSIUM SERPL-SCNC: 4 MMOL/L (ref 3.4–5.3)
PROT SERPL-MCNC: 7.1 G/DL (ref 6.8–8.8)
RBC # BLD AUTO: 4 10E12/L (ref 3.8–5.2)
RBC #/AREA URNS AUTO: 0 /HPF (ref 0–2)
SODIUM SERPL-SCNC: 140 MMOL/L (ref 133–144)
SOURCE: ABNORMAL
SP GR UR STRIP: 1.01 (ref 1–1.03)
SQUAMOUS #/AREA URNS AUTO: <1 /HPF (ref 0–1)
UROBILINOGEN UR STRIP-MCNC: NORMAL MG/DL (ref 0–2)
WBC # BLD AUTO: 4.4 10E9/L (ref 4–11)
WBC #/AREA URNS AUTO: <1 /HPF (ref 0–5)

## 2019-05-25 PROCEDURE — 80076 HEPATIC FUNCTION PANEL: CPT | Performed by: EMERGENCY MEDICINE

## 2019-05-25 PROCEDURE — 93976 VASCULAR STUDY: CPT

## 2019-05-25 PROCEDURE — 81001 URINALYSIS AUTO W/SCOPE: CPT | Performed by: EMERGENCY MEDICINE

## 2019-05-25 PROCEDURE — 96361 HYDRATE IV INFUSION ADD-ON: CPT

## 2019-05-25 PROCEDURE — 96374 THER/PROPH/DIAG INJ IV PUSH: CPT

## 2019-05-25 PROCEDURE — 25000128 H RX IP 250 OP 636: Performed by: EMERGENCY MEDICINE

## 2019-05-25 PROCEDURE — 81025 URINE PREGNANCY TEST: CPT | Performed by: EMERGENCY MEDICINE

## 2019-05-25 RX ORDER — KETOROLAC TROMETHAMINE 15 MG/ML
10 INJECTION, SOLUTION INTRAMUSCULAR; INTRAVENOUS ONCE
Status: COMPLETED | OUTPATIENT
Start: 2019-05-25 | End: 2019-05-25

## 2019-05-25 RX ORDER — SODIUM CHLORIDE 9 MG/ML
1000 INJECTION, SOLUTION INTRAVENOUS CONTINUOUS
Status: DISCONTINUED | OUTPATIENT
Start: 2019-05-25 | End: 2019-05-25 | Stop reason: HOSPADM

## 2019-05-25 RX ADMIN — KETOROLAC TROMETHAMINE 10 MG: 15 INJECTION, SOLUTION INTRAMUSCULAR; INTRAVENOUS at 00:20

## 2019-05-25 RX ADMIN — SODIUM CHLORIDE 1000 ML: 9 INJECTION, SOLUTION INTRAVENOUS at 00:20

## 2019-05-25 ASSESSMENT — ENCOUNTER SYMPTOMS
VOMITING: 0
DYSURIA: 0
NAUSEA: 0
DIARRHEA: 0
CONSTIPATION: 0
ABDOMINAL PAIN: 1

## 2019-05-25 NOTE — ED PROVIDER NOTES
"  History     Chief Complaint:  Abdominal Pain      HPI   Desirae Kevin is a 23 year old female with a history of left ovarian cysts who presents to the ED for evaluation of abdominal pain. The patient reports that she has been experiencing mild LLQ abdominal pain for the past two weeks. This became severe tonight, with the patient describing her discomfort as \"labor pains\" radiating down her left leg. She decided to present to the ED secondary to her worsening symptoms. Here, the patient denies any other radiation, and she has not had any nausea, vomiting, diarrhea, constipation, dysuria, or vaginal discharge. She does note some slight vaginal spotting, although this is her last day of her menstrual period. The patient denies any history of  or other abdominal surgery. She also denies chance of pregnancy secondary to her 's vasectomy.    Allergies:  NKDA     Medications:    Adderall  Lo/Ovral     Past Medical History:    ADHD  Migraine  Anemia  Anxiety  Depression  Polyhydramnios   Left ovarian cyst    Past Surgical History:    The patient does not have any pertinent past surgical history.    Family History:    No past pertinent family history.    Social History:  Negative for tobacco use.  Negative for alcohol use.  Negative for drug use.   Marital Status:   [2]     Review of Systems   Gastrointestinal: Positive for abdominal pain. Negative for constipation, diarrhea, nausea and vomiting.   Genitourinary: Positive for vaginal bleeding. Negative for dysuria and vaginal discharge.   All other systems reviewed and are negative.        Physical Exam     Patient Vitals for the past 24 hrs:   BP Temp Temp src Pulse Heart Rate Resp SpO2   19 0145 98/55 -- -- 68 -- -- 100 %   19 0050 -- -- -- -- -- -- 98 %   19 0025 98/67 -- -- 73 -- -- 98 %   19 0020 98/62 -- -- -- -- -- --   19 0000 -- -- -- -- -- -- 98 %   195 -- -- -- -- -- -- 96 %   19 235 " 117/66 -- -- 76 -- -- --   05/24/19 2346 108/73 97.9  F (36.6  C) Oral 85 85 18 99 %        Physical Exam  Constitutional:  Oriented to person, place, and time. Well-appearign  HENT:   Head:    Normocephalic.   Mouth/Throat:   Oropharynx is clear and moist.   Eyes:    EOM are normal. Pupils are equal, round, and reactive to light.   Neck:    Neck supple.   Cardiovascular:  Normal rate, regular rhythm and normal heart sounds.      Exam reveals no gallop and no friction rub.       No murmur heard.  Pulmonary/Chest:  Effort normal and breath sounds normal.      No respiratory distress. No wheezes. No rales.      No reproducible chest wall pain.  Abdominal:   Soft. No distension. Mild LLQ tenderness. No rebound and no guarding.      No pain on percussion of her flanks.  Musculoskeletal:  Normal range of motion.   Neurological:   Alert and oriented to person, place, and time.           Moves all 4 extremities spontaneously    Skin:    No rash noted. No pallor.      Emergency Department Course     Imaging:  Radiographic findings were communicated with the patient who voiced understanding of the findings.  US Pelvic, Complete w Transvaginal & Abd/Pel Duplex Limited:  1. Small amount of nonspecific fluid in the endometrial canal.  2. Trace nonspecific pelvic free fluid.  3. Ovaries have the appearance of polycystic ovarian disease, as per radiology.      Laboratory:  CBC: WBC: 4.4, HGB: 11.7, PLT: 201  BMP: Calcium 8.0 (L), o/w WNL (Creatinine: 0.56)    Hepatic panel: All WNL    Lipase: 192    UA with Microscopic: pH 8.0 (H), Mucous present (A), o/w WNL     HCG: Negative    Interventions:  0020 NS 1L IV   Toradol 10 mg IV    Emergency Department Course:  Nursing notes and vitals reviewed. (0005) I performed an exam of the patient as documented above.     IV inserted. Medicine administered as documented above. Blood drawn. This was sent to the lab for further testing, results above. The patient provided a urine sample here  in the emergency department. This was sent for laboratory testing, findings above.      The patient was sent for a complete pelvic ultrasound while in the emergency department, findings above.     (0155) I rechecked the patient and discussed the results of her workup thus far.     Findings and plan explained to the Patient. Patient discharged home with instructions regarding supportive care, medications, and reasons to return. The importance of close follow-up was reviewed.     I personally reviewed the laboratory results with the Patient and answered all related questions prior to discharge.      Impression & Plan      Medical Decision Making:  Desirae Kevin is a 23 year old female that came in complaining of left lower quadrant abdominal pain for the past two weeks. Differential includes: ovarian cyst, ovarian torsion, pyelonephritis, renal colic, diverticulitis, ectopic pregnancy, PID, or other causes. Workup thus far is otherwise nonspecific. She does have the appearance of possible polycystic ovaries, which she may or may not be symptomatic from. She is currently pain free upon reevaluation. I did offer a pelvic exam, which she ultimately refused. Having no vaginal discharge, I would highly doubt PID. She is told to follow-up with her OB/GYN doctor, continue with Motrin OTC, and to return for any worsening abdominal pain, fevers, vomiting, or other concerns.    Diagnosis:    ICD-10-CM    1. Acute abdominal pain R10.9    2. PCO (polycystic ovaries) E28.2        Disposition:  discharged to home      Scribe Disclosure:  I, Kalie Hernandez, am serving as a scribe on 5/25/2019 at 12:05 AM to personally document services performed by Zach Pinto MD based on my observations and the provider's statements to me.      Kalie Hernandez  5/24/2019   [facility] EMERGENCY DEPARTMENT       Zach Pinto MD  05/25/19 0226

## 2019-05-25 NOTE — ED TRIAGE NOTES
Left sided abdominal pain stated 2 weeks as mild pain, worsening today. Took tylenol 1 hour prior to arrival. History of left ovarian cyst. ABCs intact.

## 2019-06-18 PROBLEM — M25.552 BILATERAL HIP PAIN: Status: RESOLVED | Noted: 2019-03-01 | Resolved: 2019-06-18

## 2019-06-18 PROBLEM — M25.551 BILATERAL HIP PAIN: Status: RESOLVED | Noted: 2019-03-01 | Resolved: 2019-06-18

## 2019-06-18 NOTE — PROGRESS NOTES
DISCHARGE REPORT    Updated as of June 18, 2019  Progress reporting period is from Mar 1, 2019 to Apr 19, 2019.       SUBJECTIVE  Subjective changes noted by patient:  Unknown. Patient has failed to return to clinic.    Current pain level is unknown. Patient has failed to return to clinic.  .     Initial Pain level: 8/10.   Changes in function:  Unknown. Patient has failed to return to clinic.  Adverse reaction to treatment or activity: Unknown. Patient has failed to return to clinic.    OBJECTIVE  Changes noted in objective findings:  Patient has failed to return to therapy so current objective findings are unknown.    ASSESSMENT/PLAN  Updated problem list and treatment plan: Diagnosis 1:  Bilateral hip pain with hypermobility/instability  STG/LTGs have been met or progress has been made towards goals:  Unknown. Patient has failed to return to clinic.  Assessment of Progress: The patient has not returned to therapy. Current status is unknown.  Self Management Plans:  Patient has been instructed in a home treatment program.  Adeline continues to require the following intervention to meet STG and LTG's:  Unknown. Patient has failed to return to clinic.    Recommendations:  Unable to make an accurate recommendation at this time. Patient has failed to return to clinic.    Please refer to the daily flowsheet for treatment today, total treatment time and time spent performing 1:1 timed codes.

## 2020-04-11 ENCOUNTER — HOSPITAL ENCOUNTER (EMERGENCY)
Facility: CLINIC | Age: 25
Discharge: HOME | End: 2020-04-11
Attending: EMERGENCY MEDICINE | Admitting: EMERGENCY MEDICINE
Payer: COMMERCIAL

## 2020-04-11 ENCOUNTER — APPOINTMENT (OUTPATIENT)
Dept: ULTRASOUND IMAGING | Facility: CLINIC | Age: 25
End: 2020-04-11
Attending: EMERGENCY MEDICINE
Payer: COMMERCIAL

## 2020-04-11 VITALS
RESPIRATION RATE: 20 BRPM | DIASTOLIC BLOOD PRESSURE: 74 MMHG | TEMPERATURE: 97.8 F | OXYGEN SATURATION: 100 % | SYSTOLIC BLOOD PRESSURE: 101 MMHG | HEART RATE: 58 BPM

## 2020-04-11 DIAGNOSIS — R11.0 NAUSEA: ICD-10-CM

## 2020-04-11 DIAGNOSIS — R10.13 ABDOMINAL PAIN, EPIGASTRIC: ICD-10-CM

## 2020-04-11 DIAGNOSIS — K82.8 GALLBLADDER SLUDGE: ICD-10-CM

## 2020-04-11 LAB
ALBUMIN SERPL-MCNC: 3.8 G/DL (ref 3.4–5)
ALP SERPL-CCNC: 60 U/L (ref 40–150)
ALT SERPL W P-5'-P-CCNC: 22 U/L (ref 0–50)
ANION GAP SERPL CALCULATED.3IONS-SCNC: 5 MMOL/L (ref 3–14)
AST SERPL W P-5'-P-CCNC: 15 U/L (ref 0–45)
BASOPHILS # BLD AUTO: 0 10E9/L (ref 0–0.2)
BASOPHILS NFR BLD AUTO: 0.5 %
BILIRUB SERPL-MCNC: 0.4 MG/DL (ref 0.2–1.3)
BUN SERPL-MCNC: 9 MG/DL (ref 7–30)
CALCIUM SERPL-MCNC: 8.6 MG/DL (ref 8.5–10.1)
CHLORIDE SERPL-SCNC: 106 MMOL/L (ref 94–109)
CO2 SERPL-SCNC: 25 MMOL/L (ref 20–32)
CREAT SERPL-MCNC: 0.69 MG/DL (ref 0.52–1.04)
DIFFERENTIAL METHOD BLD: NORMAL
EOSINOPHIL # BLD AUTO: 0.1 10E9/L (ref 0–0.7)
EOSINOPHIL NFR BLD AUTO: 1 %
ERYTHROCYTE [DISTWIDTH] IN BLOOD BY AUTOMATED COUNT: 12 % (ref 10–15)
GFR SERPL CREATININE-BSD FRML MDRD: >90 ML/MIN/{1.73_M2}
GLUCOSE SERPL-MCNC: 126 MG/DL (ref 70–99)
HCG SERPL QL: NEGATIVE
HCT VFR BLD AUTO: 41.8 % (ref 35–47)
HGB BLD-MCNC: 14 G/DL (ref 11.7–15.7)
IMM GRANULOCYTES # BLD: 0 10E9/L (ref 0–0.4)
IMM GRANULOCYTES NFR BLD: 0.3 %
LIPASE SERPL-CCNC: 155 U/L (ref 73–393)
LYMPHOCYTES # BLD AUTO: 1.7 10E9/L (ref 0.8–5.3)
LYMPHOCYTES NFR BLD AUTO: 28.3 %
MCH RBC QN AUTO: 29.8 PG (ref 26.5–33)
MCHC RBC AUTO-ENTMCNC: 33.5 G/DL (ref 31.5–36.5)
MCV RBC AUTO: 89 FL (ref 78–100)
MONOCYTES # BLD AUTO: 0.3 10E9/L (ref 0–1.3)
MONOCYTES NFR BLD AUTO: 4.2 %
NEUTROPHILS # BLD AUTO: 4 10E9/L (ref 1.6–8.3)
NEUTROPHILS NFR BLD AUTO: 65.7 %
NRBC # BLD AUTO: 0 10*3/UL
NRBC BLD AUTO-RTO: 0 /100
PLATELET # BLD AUTO: 257 10E9/L (ref 150–450)
POTASSIUM SERPL-SCNC: 3.2 MMOL/L (ref 3.4–5.3)
PROT SERPL-MCNC: 8.3 G/DL (ref 6.8–8.8)
RBC # BLD AUTO: 4.7 10E12/L (ref 3.8–5.2)
SODIUM SERPL-SCNC: 136 MMOL/L (ref 133–144)
WBC # BLD AUTO: 6.1 10E9/L (ref 4–11)

## 2020-04-11 PROCEDURE — 25000125 ZZHC RX 250: Performed by: EMERGENCY MEDICINE

## 2020-04-11 PROCEDURE — 25000128 H RX IP 250 OP 636: Performed by: EMERGENCY MEDICINE

## 2020-04-11 PROCEDURE — 83690 ASSAY OF LIPASE: CPT | Performed by: EMERGENCY MEDICINE

## 2020-04-11 PROCEDURE — 25000132 ZZH RX MED GY IP 250 OP 250 PS 637: Performed by: EMERGENCY MEDICINE

## 2020-04-11 PROCEDURE — 96374 THER/PROPH/DIAG INJ IV PUSH: CPT

## 2020-04-11 PROCEDURE — 99285 EMERGENCY DEPT VISIT HI MDM: CPT | Mod: 25

## 2020-04-11 PROCEDURE — 25800030 ZZH RX IP 258 OP 636: Performed by: EMERGENCY MEDICINE

## 2020-04-11 PROCEDURE — 96375 TX/PRO/DX INJ NEW DRUG ADDON: CPT

## 2020-04-11 PROCEDURE — 80053 COMPREHEN METABOLIC PANEL: CPT | Performed by: EMERGENCY MEDICINE

## 2020-04-11 PROCEDURE — 85025 COMPLETE CBC W/AUTO DIFF WBC: CPT | Performed by: EMERGENCY MEDICINE

## 2020-04-11 PROCEDURE — 96361 HYDRATE IV INFUSION ADD-ON: CPT

## 2020-04-11 PROCEDURE — 84703 CHORIONIC GONADOTROPIN ASSAY: CPT | Performed by: EMERGENCY MEDICINE

## 2020-04-11 PROCEDURE — 76705 ECHO EXAM OF ABDOMEN: CPT

## 2020-04-11 RX ORDER — KETOROLAC TROMETHAMINE 15 MG/ML
15 INJECTION, SOLUTION INTRAMUSCULAR; INTRAVENOUS ONCE
Status: COMPLETED | OUTPATIENT
Start: 2020-04-11 | End: 2020-04-11

## 2020-04-11 RX ORDER — MORPHINE SULFATE 4 MG/ML
4 INJECTION, SOLUTION INTRAMUSCULAR; INTRAVENOUS ONCE
Status: COMPLETED | OUTPATIENT
Start: 2020-04-11 | End: 2020-04-11

## 2020-04-11 RX ORDER — ONDANSETRON 2 MG/ML
4 INJECTION INTRAMUSCULAR; INTRAVENOUS ONCE
Status: COMPLETED | OUTPATIENT
Start: 2020-04-11 | End: 2020-04-11

## 2020-04-11 RX ORDER — ONDANSETRON 4 MG/1
4 TABLET, ORALLY DISINTEGRATING ORAL EVERY 8 HOURS PRN
Qty: 10 TABLET | Refills: 0 | Status: SHIPPED | OUTPATIENT
Start: 2020-04-11 | End: 2020-04-14

## 2020-04-11 RX ORDER — HYDROCODONE BITARTRATE AND ACETAMINOPHEN 5; 325 MG/1; MG/1
1 TABLET ORAL EVERY 6 HOURS PRN
Qty: 10 TABLET | Refills: 0 | Status: SHIPPED | OUTPATIENT
Start: 2020-04-11 | End: 2020-04-14

## 2020-04-11 RX ADMIN — SODIUM CHLORIDE 1000 ML: 9 INJECTION, SOLUTION INTRAVENOUS at 04:40

## 2020-04-11 RX ADMIN — LIDOCAINE HYDROCHLORIDE 30 ML: 20 SOLUTION ORAL; TOPICAL at 05:48

## 2020-04-11 RX ADMIN — KETOROLAC TROMETHAMINE 15 MG: 15 INJECTION, SOLUTION INTRAMUSCULAR; INTRAVENOUS at 05:45

## 2020-04-11 RX ADMIN — MORPHINE SULFATE 4 MG: 4 INJECTION INTRAVENOUS at 05:22

## 2020-04-11 RX ADMIN — FAMOTIDINE 20 MG: 10 INJECTION INTRAVENOUS at 04:48

## 2020-04-11 RX ADMIN — ONDANSETRON 4 MG: 2 INJECTION INTRAMUSCULAR; INTRAVENOUS at 04:40

## 2020-04-11 ASSESSMENT — ENCOUNTER SYMPTOMS
DYSURIA: 0
SHORTNESS OF BREATH: 0
NAUSEA: 1
CONSTIPATION: 0
FEVER: 0
HEMATURIA: 0
DIARRHEA: 0
VOMITING: 0
ABDOMINAL PAIN: 1

## 2020-04-11 NOTE — ED PROVIDER NOTES
History     Chief Complaint:  Abdominal Pain     HPI   Desirae Kevin is a 24 year old female who presents for evaluation of abdominal pain. The patient reports upper quadrant  abdominal pain radiating to her back with associated nausea since 1900 last night. She states laying on her stomach and applying pressure to her stomach to relieve the pain. The patient endorses a history of colitis, but states this sensation feels different. She denies dysuria, hematuria, foul smelling urine, vomiting, diarrhea, fever, constipation, chest pain, or shortness of breath. No history of abdominal surgeries. No history of gallstones.       Allergies:  No Known Drug Allergies    Medications:   Adderall XR  Desogestrel-ethinyl estradiol    Past Medical History:    Anemia  Migraine  ADHD   Ovarian cyst  Anxiety  Depression    Past Surgical History:    The patient does not have any pertinent past surgical history.     Family History:    No past pertinent family history.     Social History:  The patient presents unaccompanied to the ED.  PCP: Marlyn Paniagua  Smoking status: Never Smoker  Smokeless tobacco: Never Used  Alcohol use: Negative   Drug use: Negative    Review of Systems   Constitutional: Negative for fever.   Respiratory: Negative for shortness of breath.    Cardiovascular: Negative for chest pain.   Gastrointestinal: Positive for abdominal pain and nausea. Negative for constipation, diarrhea and vomiting.   Genitourinary: Negative for dysuria and hematuria.   All other systems reviewed and are negative.        Physical Exam     Patient Vitals for the past 24 hrs:   BP Temp Temp src Heart Rate Resp SpO2   04/11/20 0520 112/78 -- -- 50 18 100 %   04/11/20 0430 122/80 -- -- 50 18 99 %   04/11/20 0418 130/87 97.8  F (36.6  C) Oral 61 18 100 %       Physical Exam    I have reviewed the triage vital signs    Constitutional: Appears stated age  Eyes: No discharge, symmetrical lids  ENT: Moist mucous membranes, no ear  discharge  Neck: Full range of motion  Respiratory: CTAB, no wheezes  Cardiovascular: Regular rate and rhythm, no lower extremity edema  Chest: Equal rise  Gastrointestinal: Soft. Nondistended. NTTP. No rebound or guarding. Negative Tavares's.  Pain improved with palpation of epigastrium and RUQ.  Musculoskeletal: No gross deformities.   Skin: Warm and well perfused. No visible rash.  Neurologic: Moves all extremities, speech fluent without dysarthria  Psychiatric: Appropriate affect, alert and interactive       Emergency Department Course     Imaging:  Radiographic findings were communicated with the patient who voiced understanding of the findings.    US Abdomen Limited (RUQ)  IMPRESSION:   1. Unremarkable appearance of the gallbladder and liver.  2. No biliary dilatation. Reading per radiology.     Laboratory:  Laboratory findings were communicated with the patient who voiced understanding of the findings.    CBC: WBC: 6.1, HGB 14.0, PLT: 257  CMP: Glucose 126 (H), Potassium 3.2 (L), o/w WNL (Creatinine: 0.69)  Lipase: 155  HCG qualitative blood: Negative     UA: pending     Interventions:  0440 NS 1000 mL IV Bolus  0440 Zofran 4 mg IV  0448 Pepcid 20 mg IV   0522 Morphine 4 mg IV    Emergency Department Course:  Past medical records, nursing notes, and vitals reviewed.   0425 I performed an exam of the patient and obtained history, as documented above.    IV was inserted and blood was drawn for laboratory testing, results above.     The patient provided a urine sample here in the emergency department. This was sent for laboratory testing, findings above.     The patient was sent for a US Abdomen Limited (RUQ) while in the emergency department, results above.      Medication and IV fluids administered.     I rechecked the patient. Explained findings to the Patient.    Findings and plan explained to the Patient. Patient discharged home with instructions regarding supportive care, medications, and reasons to return.  The importance of close follow-up was reviewed.    I personally reviewed the laboratory and imaging results with the Patient and answered all related questions prior to discharge.    Impression & Plan      Medical Decision MakinF presenting with epigastric and RUQ abd pain.  DDx includes but is not limited to cholelithiasis, cholecystitis, choledocholithaisis, pancreatitis, gastritis, PUD  VS reassuring.  Abd exam reassuring.  No tenderness; pain actually improves with palpation.  Labs reassuring.  No e/o cholestasis.  No e/o leukocytosis.  RUQ US obtained.  Read by radiology as negative.  Possible sludge seen; possibly related to symptoms.  Sx controlled as above.  Pt reexamined at 5:42 AM, abd soft, pain significantly improved, abd nontender.  Advised f/u with PCP for referall to GI and surgery.  Strict RTED precautions given.    Diagnosis:    ICD-10-CM    1. Abdominal pain, epigastric  R10.13    2. Nausea  R11.0    3. Gallbladder sludge  K82.8         Disposition:  Discharged to home.    Discharge Medications:  New Prescriptions    HYDROCODONE-ACETAMINOPHEN (NORCO) 5-325 MG TABLET    Take 1 tablet by mouth every 6 hours as needed for severe pain    ONDANSETRON (ZOFRAN ODT) 4 MG ODT TAB    Take 1 tablet (4 mg) by mouth every 8 hours as needed for nausea         Scribe Disclosure:  I, Maria Isabel Giordano, am serving as a scribe on 2020 at 4:20 AM to personally document services performed by Grant Rodriguez MD based on my observations and the provider's statements to me.      2020   Grant Lopez MD  20 0542       Grant Rodriguez MD  20 0543

## 2022-02-02 ENCOUNTER — HOSPITAL ENCOUNTER (EMERGENCY)
Facility: CLINIC | Age: 27
Discharge: HOME | End: 2022-02-02
Attending: EMERGENCY MEDICINE | Admitting: EMERGENCY MEDICINE
Payer: COMMERCIAL

## 2022-02-02 VITALS
HEART RATE: 84 BPM | BODY MASS INDEX: 20.06 KG/M2 | TEMPERATURE: 97.4 F | RESPIRATION RATE: 16 BRPM | WEIGHT: 115.96 LBS | OXYGEN SATURATION: 100 % | SYSTOLIC BLOOD PRESSURE: 111 MMHG | DIASTOLIC BLOOD PRESSURE: 74 MMHG

## 2022-02-02 DIAGNOSIS — R51.9 ACUTE NONINTRACTABLE HEADACHE, UNSPECIFIED HEADACHE TYPE: ICD-10-CM

## 2022-02-02 DIAGNOSIS — K05.30 PERICORONITIS: ICD-10-CM

## 2022-02-02 DIAGNOSIS — K01.1 DENTAL IMPACTION: ICD-10-CM

## 2022-02-02 PROCEDURE — 250N000013 HC RX MED GY IP 250 OP 250 PS 637: Performed by: EMERGENCY MEDICINE

## 2022-02-02 PROCEDURE — 250N000011 HC RX IP 250 OP 636: Performed by: EMERGENCY MEDICINE

## 2022-02-02 PROCEDURE — 96374 THER/PROPH/DIAG INJ IV PUSH: CPT

## 2022-02-02 PROCEDURE — 99284 EMERGENCY DEPT VISIT MOD MDM: CPT | Mod: 25

## 2022-02-02 RX ORDER — ACETAMINOPHEN 325 MG/1
975 TABLET ORAL ONCE
Status: COMPLETED | OUTPATIENT
Start: 2022-02-02 | End: 2022-02-02

## 2022-02-02 RX ORDER — CHLORHEXIDINE GLUCONATE ORAL RINSE 1.2 MG/ML
15 SOLUTION DENTAL 2 TIMES DAILY
Qty: 118 ML | Refills: 0 | Status: SHIPPED | OUTPATIENT
Start: 2022-02-02

## 2022-02-02 RX ORDER — PENICILLIN V POTASSIUM 500 MG/1
500 TABLET, FILM COATED ORAL 4 TIMES DAILY
Qty: 28 TABLET | Refills: 0 | Status: SHIPPED | OUTPATIENT
Start: 2022-02-02 | End: 2022-02-09

## 2022-02-02 RX ORDER — KETOROLAC TROMETHAMINE 15 MG/ML
15 INJECTION, SOLUTION INTRAMUSCULAR; INTRAVENOUS ONCE
Status: COMPLETED | OUTPATIENT
Start: 2022-02-02 | End: 2022-02-02

## 2022-02-02 RX ADMIN — ACETAMINOPHEN 975 MG: 325 TABLET, FILM COATED ORAL at 11:39

## 2022-02-02 RX ADMIN — KETOROLAC TROMETHAMINE 15 MG: 15 INJECTION, SOLUTION INTRAMUSCULAR; INTRAVENOUS at 11:39

## 2022-02-02 ASSESSMENT — ENCOUNTER SYMPTOMS
RHINORRHEA: 0
PHOTOPHOBIA: 1
CHILLS: 0
NECK PAIN: 0
FEVER: 0
HEADACHES: 1

## 2022-02-02 NOTE — ED TRIAGE NOTES
Headache for two days.  Also notes impacted wisdom teeth that may be infected.  Pain in teeth started on Saturday.  No medication taken at home.

## 2022-02-02 NOTE — DISCHARGE INSTRUCTIONS
I recommend alternating doses of Tylenol and ibuprofen to help with your headache and your dental pain.  This will also help with the inflammation and swelling around your gum especially on the right side.  You should use the Peridex and the antibiotics as prescribed as this will also help with inflammation and treat any early infection.  You should make an appointment to follow-up with the oral surgeon for recheck as I do feel your headache is likely related to your gum irritation and your dental infection.  Should you have worsening headache, develop high fevers you should return the emergency department.

## 2022-02-02 NOTE — ED PROVIDER NOTES
History   Chief Complaint:  Headache and Dental Pain       The history is provided by the patient.      Desirae Kevin is a 26 year old female who presents with headache and dental pain. The patient reports waking up last evening with a generalized headache radiating from the temples. She also notes lower bilateral wisdom tooth and gum pain with inflammation. Her wisdom teeth are reportedly impacted, but the patient has not yet been able to have them extracted. She endorses sound sensitivity, along with light sensitivity of a greater severity. The patient has not taken any medication for her pain. She denies a history of migraines. The patient also denies fever, chills, runny nose, sore throat, cough, or neck pain.    Review of Systems   Constitutional: Negative for chills and fever.   HENT: Positive for dental problem. Negative for rhinorrhea.    Eyes: Positive for photophobia.   Musculoskeletal: Negative for neck pain.   Neurological: Positive for headaches.   All other systems reviewed and are negative.      Allergies:  No known allergies.    Medications:  Adderall  Norgestrel-ethinyl estradiol  Restoril      Past Medical History:     ADHD  Anemia  Anxiety  Depressive disorder  Migraine  Ovarian cyst    Polyhydramnios  Varicella    Past Surgical History:    The patient denies past surgical history.     Family History:    Mother: Amblyopia/strabismus    Social History:  The patient presented to the ED alone.  Arrives via car    Physical Exam     Patient Vitals for the past 24 hrs:   BP Temp Temp src Pulse Resp SpO2 Weight   02/02/22 0944 111/74 97.4  F (36.3  C) Temporal 84 16 100 % 52.6 kg (115 lb 15.4 oz)       Physical Exam  Constitutional: Alert, attentive, GCS 15   HENT:    Mouth/Throat: Posterior inferior gingival erythema extending over right posterior molar. Mild tenderness to percussion. No trismus. Floor of the mouth soft.  Neck: Full range of motion.  Eyes: EOM are normal, anicteric, conjugate  gaze  CV: distal extremities warm, well perfused  Chest: Non-labored breathing on RA  GI:  Non tender. No distension. No guarding or rebound.    Neurological: Alert, attentive, moving all extremities equally.   Skin: Skin is warm and dry.    Emergency Department Course     Emergency Department Course:     Reviewed:  I reviewed nursing notes, vitals, past medical history and Care Everywhere    Assessments:  1012 I obtained history and examined the patient as noted above.     Interventions:  1139 Toradol 15 mg IV  1139 Tylenol 975 mg PO    Disposition:  The patient was discharged to home.     Impression & Plan   Medical Decision Makin-year-old woman past medical history significant for remote migraines, depression, anxiety presenting for evaluation of dental pain associated with gradual onset headache.  She has known impacted wisdom teeth and on exam she does have what appears to be pericoronitis of the right inferior gingiva.  No obvious deep space infection on exam.  With respect to her headache, this is almost certainly referred pain secondary to her dental impaction, versus possible migraine.  I do not suspect CNS infection mass or bleed.  Imaging deferred.  She was given Tylenol and Toradol with improvement in her headache.  With respect to her pericarditis and dental pain, I will initiate her on Peridex and penicillin.  Return precautions reviewed I stressed the importance of following up with oral surgery.    Diagnosis:    ICD-10-CM    1. Pericoronitis  K05.30    2. Acute nonintractable headache, unspecified headache type  R51.9    3. Dental impaction  K01.1        Discharge Medications:  New Prescriptions    CHLORHEXIDINE (PERIDEX) 0.12 % SOLUTION    Swish and spit 15 mLs in mouth 2 times daily    PENICILLIN V (VEETID) 500 MG TABLET    Take 1 tablet (500 mg) by mouth 4 times daily for 7 days     Jose Burger MD  [employer]  1:46 PM 22       Yazmin  Disclosure:  I, Harish Jim, am serving as a scribe at 10:16 AM on 2/2/2022 to document services personally performed by Jose Burger MD based on my observations and the provider's statements to me.     I, Rafael Corbett, am serving as a scribe  on 2/2/2022 at 12:34 PM to personally document services performed by Jose Burger MD based on my observations and the provider's statements to me.               Jose Burger MD  02/02/22 4711

## 2023-09-19 NOTE — ED AVS SNAPSHOT
[facility] Emergency Department  [address]  [facility] [address]  Phone:  [phone]  Fax:  [phone]                                    Desirae Kevin   MRN: [id number]    Department:  [facility] Emergency Department   Date of Visit:  4/11/2020           After Visit Summary Signature Page    I have received my discharge instructions, and my questions have been answered. I have discussed any challenges I see with this plan with the nurse or doctor.    ..........................................................................................................................................  Patient/Patient Representative Signature      ..........................................................................................................................................  Patient Representative Print Name and Relationship to Patient    ..................................................               ................................................  Date                                   Time    ..........................................................................................................................................  Reviewed by Signature/Title    ...................................................              ..............................................  Date                                               Time          22EPIC Rev 08/18        Rhomboid Transposition Flap Text: The defect edges were debeveled with a #15 scalpel blade.  Given the location of the defect and the proximity to free margins a rhomboid transposition flap was deemed most appropriate.  Using a sterile surgical marker, an appropriate rhomboid flap was drawn incorporating the defect.    The area thus outlined was incised deep to adipose tissue with a #15 scalpel blade.  The skin margins were undermined to an appropriate distance in all directions utilizing iris scissors.

## 2024-10-21 NOTE — ED AVS SNAPSHOT
[facility] Emergency Department  [address]  [facility] [address]  Phone:  [phone]  Fax:  [phone]                                    Desirae Kevin   MRN: [id number]    Department:  [facility] Emergency Department   Date of Visit:  5/24/2019           After Visit Summary Signature Page    I have received my discharge instructions, and my questions have been answered. I have discussed any challenges I see with this plan with the nurse or doctor.    ..........................................................................................................................................  Patient/Patient Representative Signature      ..........................................................................................................................................  Patient Representative Print Name and Relationship to Patient    ..................................................               ................................................  Date                                   Time    ..........................................................................................................................................  Reviewed by Signature/Title    ...................................................              ..............................................  Date                                               Time          22EPIC Rev 08/18        No